# Patient Record
Sex: FEMALE | Race: BLACK OR AFRICAN AMERICAN | NOT HISPANIC OR LATINO | Employment: FULL TIME | URBAN - METROPOLITAN AREA
[De-identification: names, ages, dates, MRNs, and addresses within clinical notes are randomized per-mention and may not be internally consistent; named-entity substitution may affect disease eponyms.]

---

## 2022-08-09 ENCOUNTER — TELEPHONE (OUTPATIENT)
Dept: PHYSICAL MEDICINE AND REHAB | Facility: CLINIC | Age: 52
End: 2022-08-09

## 2022-08-09 NOTE — TELEPHONE ENCOUNTER
----- Message from Lorri Kelley sent at 8/9/2022 12:56 PM CDT -----  Contact: Nallely  The patient is requesting a callback from the nurse in regards to scheduling a new patient appointment for 08/10/2022 after 12:00 please.    The patient has pain, tingling in her left arm and back and is concerned that the medications (Meloxicam 15 mg) she was given by urgent care med team  ([facility]) is not working and she still hurts even when resting her arm it it tingling. Symptoms started July 29, 2022 suddenly while she was standing. She also went to [facility].    The patient says her first index finger keeps getting numb and it feels like she burned herself and she keeps it wrapped with a band aid to keep it from touching anything. Blood pressure was 146-84 today. Last week  149-87.     Please call Nallely at [phone] (home)       Thanks

## 2022-09-22 DIAGNOSIS — M79.642 LEFT HAND PAIN: Primary | ICD-10-CM

## 2023-10-20 ENCOUNTER — TELEPHONE (OUTPATIENT)
Dept: DERMATOLOGY | Facility: CLINIC | Age: 53
End: 2023-10-20
Payer: COMMERCIAL

## 2023-10-20 NOTE — TELEPHONE ENCOUNTER
Contacted patient about soonest appointment added patient to our wait list. Patient verbalized understanding.  ----- Message from Venkat Lafleur sent at 10/20/2023  1:56 PM CDT -----  Contact: Nalleyl Solorio is calling in regards to getting an appt as soon as possible. Pt is having skin splashes around her neck. Please call back at [phone]                    Thanks  DEEPTHI

## 2023-10-30 ENCOUNTER — OFFICE VISIT (OUTPATIENT)
Dept: GASTROENTEROLOGY | Facility: CLINIC | Age: 53
End: 2023-10-30
Payer: COMMERCIAL

## 2023-10-30 ENCOUNTER — HOSPITAL ENCOUNTER (OUTPATIENT)
Dept: RADIOLOGY | Facility: HOSPITAL | Age: 53
Discharge: HOME | End: 2023-10-30
Attending: NURSE PRACTITIONER
Payer: COMMERCIAL

## 2023-10-30 VITALS
WEIGHT: 184 LBS | BODY MASS INDEX: 29.57 KG/M2 | DIASTOLIC BLOOD PRESSURE: 86 MMHG | SYSTOLIC BLOOD PRESSURE: 124 MMHG | HEIGHT: 66 IN | HEART RATE: 64 BPM

## 2023-10-30 DIAGNOSIS — R10.13 EPIGASTRIC PAIN: Primary | ICD-10-CM

## 2023-10-30 DIAGNOSIS — K76.0 FATTY LIVER: ICD-10-CM

## 2023-10-30 DIAGNOSIS — R10.13 EPIGASTRIC PAIN: ICD-10-CM

## 2023-10-30 DIAGNOSIS — R10.12 LUQ PAIN: ICD-10-CM

## 2023-10-30 PROCEDURE — 99999 PR PBB SHADOW E&M-EST. PATIENT-LVL V: CPT | Mod: PBBFAC,,, | Performed by: NURSE PRACTITIONER

## 2023-10-30 PROCEDURE — 4010F ACE/ARB THERAPY RXD/TAKEN: CPT | Mod: CPTII,S$GLB,, | Performed by: NURSE PRACTITIONER

## 2023-10-30 PROCEDURE — 99204 PR OFFICE/OUTPT VISIT, NEW, LEVL IV, 45-59 MIN: ICD-10-PCS | Mod: S$GLB,,, | Performed by: NURSE PRACTITIONER

## 2023-10-30 PROCEDURE — 4010F PR ACE/ARB THEARPY RXD/TAKEN: ICD-10-PCS | Mod: CPTII,S$GLB,, | Performed by: NURSE PRACTITIONER

## 2023-10-30 PROCEDURE — 3008F BODY MASS INDEX DOCD: CPT | Mod: CPTII,S$GLB,, | Performed by: NURSE PRACTITIONER

## 2023-10-30 PROCEDURE — 99204 OFFICE O/P NEW MOD 45 MIN: CPT | Mod: S$GLB,,, | Performed by: NURSE PRACTITIONER

## 2023-10-30 PROCEDURE — 3074F SYST BP LT 130 MM HG: CPT | Mod: CPTII,S$GLB,, | Performed by: NURSE PRACTITIONER

## 2023-10-30 PROCEDURE — 1160F RVW MEDS BY RX/DR IN RCRD: CPT | Mod: CPTII,S$GLB,, | Performed by: NURSE PRACTITIONER

## 2023-10-30 PROCEDURE — 3008F PR BODY MASS INDEX (BMI) DOCUMENTED: ICD-10-PCS | Mod: CPTII,S$GLB,, | Performed by: NURSE PRACTITIONER

## 2023-10-30 PROCEDURE — 3079F PR MOST RECENT DIASTOLIC BLOOD PRESSURE 80-89 MM HG: ICD-10-PCS | Mod: CPTII,S$GLB,, | Performed by: NURSE PRACTITIONER

## 2023-10-30 PROCEDURE — 99999 PR PBB SHADOW E&M-EST. PATIENT-LVL V: ICD-10-PCS | Mod: PBBFAC,,, | Performed by: NURSE PRACTITIONER

## 2023-10-30 PROCEDURE — 74019 XR ABDOMEN FLAT AND ERECT: ICD-10-PCS | Mod: 26,,, | Performed by: STUDENT IN AN ORGANIZED HEALTH CARE EDUCATION/TRAINING PROGRAM

## 2023-10-30 PROCEDURE — 1159F PR MEDICATION LIST DOCUMENTED IN MEDICAL RECORD: ICD-10-PCS | Mod: CPTII,S$GLB,, | Performed by: NURSE PRACTITIONER

## 2023-10-30 PROCEDURE — 3079F DIAST BP 80-89 MM HG: CPT | Mod: CPTII,S$GLB,, | Performed by: NURSE PRACTITIONER

## 2023-10-30 PROCEDURE — 1159F MED LIST DOCD IN RCRD: CPT | Mod: CPTII,S$GLB,, | Performed by: NURSE PRACTITIONER

## 2023-10-30 PROCEDURE — 1160F PR REVIEW ALL MEDS BY PRESCRIBER/CLIN PHARMACIST DOCUMENTED: ICD-10-PCS | Mod: CPTII,S$GLB,, | Performed by: NURSE PRACTITIONER

## 2023-10-30 PROCEDURE — 74019 RADEX ABDOMEN 2 VIEWS: CPT | Mod: 26,,, | Performed by: STUDENT IN AN ORGANIZED HEALTH CARE EDUCATION/TRAINING PROGRAM

## 2023-10-30 PROCEDURE — 3074F PR MOST RECENT SYSTOLIC BLOOD PRESSURE < 130 MM HG: ICD-10-PCS | Mod: CPTII,S$GLB,, | Performed by: NURSE PRACTITIONER

## 2023-10-30 PROCEDURE — 74019 RADEX ABDOMEN 2 VIEWS: CPT | Mod: TC

## 2023-10-30 RX ORDER — AMOXICILLIN 500 MG
CAPSULE ORAL DAILY
COMMUNITY

## 2023-10-30 RX ORDER — ACETAMINOPHEN 500 MG
1 TABLET ORAL DAILY
COMMUNITY

## 2023-10-30 RX ORDER — ATORVASTATIN CALCIUM 10 MG/1
10 TABLET, FILM COATED ORAL DAILY
COMMUNITY

## 2023-10-30 NOTE — PROGRESS NOTES
Clinic Consult:  [facility] Consultation Note    Reason for Consult:  The primary encounter diagnosis was Epigastric pain. Diagnoses of LUQ pain and Fatty liver were also pertinent to this visit.    PCP: Monica Hinojosa   No address on file    HPI:  This is a 53 y.o. female here for evaluation of the above.     Abdominal pain. Onset of symptoms started 4-6 months ago. Pain is located over the epigastric region and LUQ. Pain worsens with eating. Associated with abdominal bloating. She does have some constipation. She has been using prunes and magnesium supplement which has helped some.     She does have a history of fatty liver that was diagnosed on US in 2020.   She does walk a few days a week. She does not drink any alcohol. She used to eat a lot of frozen dinners- has since stopped them. She is now mostly eating fish, lean poultry and vegetables.     Review of Systems   Constitutional:  Negative for fever and weight loss.   HENT:  Negative for sore throat.    Respiratory:  Negative for cough, shortness of breath and wheezing.    Cardiovascular:  Negative for chest pain and palpitations.   Gastrointestinal:  Positive for abdominal pain and constipation. Negative for blood in stool, diarrhea, heartburn, melena, nausea and vomiting.   Genitourinary:  Negative for dysuria and frequency.   Skin:  Negative for itching and rash.   Neurological:  Negative for dizziness, speech change, seizures, loss of consciousness and headaches.       Medical History:  has a past medical history of Anemia, unspecified, Essential (primary) hypertension, Fatty liver disease, nonalcoholic, and Fibroids.    Surgical History:  has a past surgical history that includes ABE with LS&O (06/20/2019); Breast biopsy; Hysterectomy (2019); Oophorectomy; and Colonoscopy (09/2021).    Family History: family history includes Breast cancer in her paternal aunt and another family member; Colon cancer in an other family member; Diabetes in  "an other family member; Hypertension in her mother and sister; Ovarian cancer in an other family member; Pancreatic cancer in her father..     Social History:  reports that she has never smoked. She has never used smokeless tobacco. She reports current alcohol use. She reports that she does not use drugs.    Allergies: Reviewed    Home Medications:   Current Outpatient Medications on File Prior to Visit   Medication Sig Dispense Refill    atorvastatin (LIPITOR) 10 MG tablet Take 10 mg by mouth once daily.      cholecalciferol, vitamin D3, (VITAMIN D3) 50 mcg (2,000 unit) Cap capsule Take 1 capsule by mouth once daily.      estradioL (ESTRACE) 0.01 % (0.1 mg/gram) vaginal cream Insert 1 gram vaginally hs x 14, then twice weekly 42.5 g 2    lisinopriL-hydrochlorothiazide (PRINZIDE,ZESTORETIC) 20-12.5 mg per tablet       multivitamin (THERAGRAN) per tablet Take 1 tablet by mouth once daily.      omega-3 fatty acids/fish oil (FISH OIL-OMEGA-3 FATTY ACIDS) 300-1,000 mg capsule Take by mouth once daily.       No current facility-administered medications on file prior to visit.       Physical Exam:  /86 (BP Location: Right arm, Patient Position: Sitting, BP Method: Medium (Manual))   Pulse 64   Ht 5' 6" (1.676 m)   Wt 83.5 kg (184 lb)   BMI 29.70 kg/m²   Body mass index is 29.7 kg/m².  Physical Exam  Constitutional:       General: She is not in acute distress.  Cardiovascular:      Rate and Rhythm: Normal rate and regular rhythm.      Heart sounds: Normal heart sounds. No murmur heard.  Pulmonary:      Effort: Pulmonary effort is normal. No respiratory distress.      Breath sounds: Normal breath sounds.   Abdominal:      General: Bowel sounds are normal.   Neurological:      Mental Status: She is alert.   Psychiatric:         Mood and Affect: Mood normal.         Behavior: Behavior normal.         Labs: Pertinent labs reviewed.  CRC Screenin.     Assessment:  1. Epigastric pain    2. LUQ pain    3. Fatty " liver        Recommendations:   - EGD  - xray today  - US  - weight loss through diet and exercise. Discussed Mediterranean diet     Epigastric pain  -     X-Ray Abdomen Flat And Erect; Future; Expected date: 10/30/2023  -     US Abdomen Complete; Future; Expected date: 10/30/2023  -     Cancel: H. pylori antigen, stool; Future; Expected date: 10/30/2023  -     Case Request Endoscopy: ESOPHAGOGASTRODUODENOSCOPY (EGD)  -     Ambulatory referral/consult to Endo Procedure ; Future; Expected date: 10/31/2023    LUQ pain  -     X-Ray Abdomen Flat And Erect; Future; Expected date: 10/30/2023  -     US Abdomen Complete; Future; Expected date: 10/30/2023  -     Cancel: H. pylori antigen, stool; Future; Expected date: 10/30/2023  -     Case Request Endoscopy: ESOPHAGOGASTRODUODENOSCOPY (EGD)  -     Ambulatory referral/consult to Endo Procedure ; Future; Expected date: 10/31/2023    Fatty liver  -     US Abdomen Complete; Future; Expected date: 10/30/2023    Follow up to be determined after results/ procedure(s).    Thank you so much for allowing me to participate in the care of BRI Chávez

## 2023-10-31 ENCOUNTER — HOSPITAL ENCOUNTER (OUTPATIENT)
Dept: PREADMISSION TESTING | Facility: HOSPITAL | Age: 53
Discharge: HOME | End: 2023-10-31
Attending: INTERNAL MEDICINE
Payer: COMMERCIAL

## 2023-10-31 DIAGNOSIS — R10.13 EPIGASTRIC PAIN: ICD-10-CM

## 2023-10-31 DIAGNOSIS — R10.12 LUQ PAIN: ICD-10-CM

## 2023-11-02 ENCOUNTER — HOSPITAL ENCOUNTER (OUTPATIENT)
Dept: RADIOLOGY | Facility: HOSPITAL | Age: 53
Discharge: HOME | End: 2023-11-02
Attending: NURSE PRACTITIONER
Payer: COMMERCIAL

## 2023-11-02 DIAGNOSIS — D73.89 LESION OF SPLEEN: Primary | ICD-10-CM

## 2023-11-02 DIAGNOSIS — R10.13 EPIGASTRIC PAIN: ICD-10-CM

## 2023-11-02 DIAGNOSIS — K76.0 FATTY LIVER: ICD-10-CM

## 2023-11-02 DIAGNOSIS — R10.12 LUQ PAIN: ICD-10-CM

## 2023-11-02 PROCEDURE — 76700 US EXAM ABDOM COMPLETE: CPT | Mod: 26,,, | Performed by: RADIOLOGY

## 2023-11-02 PROCEDURE — 76700 US ABDOMEN COMPLETE: ICD-10-PCS | Mod: 26,,, | Performed by: RADIOLOGY

## 2023-11-02 PROCEDURE — 76700 US EXAM ABDOM COMPLETE: CPT | Mod: TC

## 2023-11-03 ENCOUNTER — TELEPHONE (OUTPATIENT)
Dept: GASTROENTEROLOGY | Facility: CLINIC | Age: 53
End: 2023-11-03
Payer: COMMERCIAL

## 2023-11-03 NOTE — TELEPHONE ENCOUNTER
----- Message from Mikki Calderon sent at 11/3/2023  9:40 AM CDT -----  Contact: Nallely  Type:  Needs Medical Advice    Who Called: Nallely  Symptoms (please be specific): None  How long has patient had these symptoms:  None  Pharmacy name and phone #:    Not provided  Would the patient rather a call back or a response via [facility]? call  Best Call Back Number: [phone]   Additional Information: Patient reports receiving stool softener and request to be informed if able to take meloxicam prescription today.   Thank you,  GH

## 2023-11-06 ENCOUNTER — TELEPHONE (OUTPATIENT)
Dept: GASTROENTEROLOGY | Facility: CLINIC | Age: 53
End: 2023-11-06
Payer: COMMERCIAL

## 2023-11-06 NOTE — TELEPHONE ENCOUNTER
----- Message from Garry Lopez sent at 11/6/2023  9:00 AM CST -----  Name of Who is Calling:BEATRICE WADSWORTH [[id number]]        What is the request in detail:Pt would like a callback in regards to discussing questions/concerns in regards to xrays and us that were taken. Please advise thank you        Can the clinic reply by [facility]:NO        What Number to Call Back if not in [facility]:.Telephone Information:  [facility]          [phone]

## 2023-12-21 PROBLEM — K59.01 SLOW TRANSIT CONSTIPATION: Status: ACTIVE | Noted: 2023-12-21

## 2023-12-21 PROBLEM — R10.13 EPIGASTRIC PAIN: Status: ACTIVE | Noted: 2023-12-21

## 2023-12-22 ENCOUNTER — TELEPHONE (OUTPATIENT)
Dept: GASTROENTEROLOGY | Facility: CLINIC | Age: 53
End: 2023-12-22
Payer: COMMERCIAL

## 2023-12-22 NOTE — TELEPHONE ENCOUNTER
----- Message from Nicky Weiss sent at 12/22/2023 10:56 AM CST -----  Contact: heron  Patient stated that she was advised to take miralax. She has been taking miralax since November 3rd and nothing has changed. Please call her to advise if she needs to change medications. [phone].      Thanks  DD

## 2023-12-26 ENCOUNTER — PATIENT MESSAGE (OUTPATIENT)
Dept: GASTROENTEROLOGY | Facility: CLINIC | Age: 53
End: 2023-12-26
Payer: COMMERCIAL

## 2024-01-04 ENCOUNTER — TELEPHONE (OUTPATIENT)
Dept: GASTROENTEROLOGY | Facility: CLINIC | Age: 54
End: 2024-01-04
Payer: COMMERCIAL

## 2024-01-04 NOTE — TELEPHONE ENCOUNTER
----- Message from Diane Morris sent at 1/4/2024  2:01 PM CST -----  Contact: Self [phone]  Patient is calling about test results. Please callback [phone] Thanks KL

## 2024-01-08 ENCOUNTER — PATIENT MESSAGE (OUTPATIENT)
Dept: GASTROENTEROLOGY | Facility: CLINIC | Age: 54
End: 2024-01-08
Payer: COMMERCIAL

## 2024-01-08 DIAGNOSIS — D18.03 LIVER HEMANGIOMA: Primary | ICD-10-CM

## 2024-01-12 DIAGNOSIS — K59.04 CHRONIC IDIOPATHIC CONSTIPATION: Primary | ICD-10-CM

## 2024-01-12 NOTE — PROGRESS NOTES
Received message from nursing staff that they spoke to patient who says Miralax is not controlling constipation. Sending in Linzess.

## 2024-04-03 ENCOUNTER — LAB VISIT (OUTPATIENT)
Dept: LAB | Facility: HOSPITAL | Age: 54
End: 2024-04-03
Attending: DERMATOLOGY
Payer: COMMERCIAL

## 2024-04-03 ENCOUNTER — OFFICE VISIT (OUTPATIENT)
Dept: DERMATOLOGY | Facility: CLINIC | Age: 54
End: 2024-04-03
Payer: COMMERCIAL

## 2024-04-03 DIAGNOSIS — L71.9 ROSACEA: Primary | ICD-10-CM

## 2024-04-03 DIAGNOSIS — L71.0 PERIORIFICIAL DERMATITIS: ICD-10-CM

## 2024-04-03 DIAGNOSIS — L71.9 ROSACEA: ICD-10-CM

## 2024-04-03 LAB
BASOPHILS # BLD AUTO: 0.06 K/UL (ref 0–0.2)
BASOPHILS NFR BLD: 0.9 % (ref 0–1.9)
DIFFERENTIAL METHOD BLD: NORMAL
EOSINOPHIL # BLD AUTO: 0.2 K/UL (ref 0–0.5)
EOSINOPHIL NFR BLD: 3.6 % (ref 0–8)
ERYTHROCYTE [DISTWIDTH] IN BLOOD BY AUTOMATED COUNT: 12.7 % (ref 11.5–14.5)
HCT VFR BLD AUTO: 40.7 % (ref 37–48.5)
HGB BLD-MCNC: 13.2 G/DL (ref 12–16)
IMM GRANULOCYTES # BLD AUTO: 0.01 K/UL (ref 0–0.04)
IMM GRANULOCYTES NFR BLD AUTO: 0.2 % (ref 0–0.5)
LYMPHOCYTES # BLD AUTO: 2.8 K/UL (ref 1–4.8)
LYMPHOCYTES NFR BLD: 41.9 % (ref 18–48)
MCH RBC QN AUTO: 28 PG (ref 27–31)
MCHC RBC AUTO-ENTMCNC: 32.4 G/DL (ref 32–36)
MCV RBC AUTO: 86 FL (ref 82–98)
MONOCYTES # BLD AUTO: 0.5 K/UL (ref 0.3–1)
MONOCYTES NFR BLD: 7.3 % (ref 4–15)
NEUTROPHILS # BLD AUTO: 3.1 K/UL (ref 1.8–7.7)
NEUTROPHILS NFR BLD: 46.1 % (ref 38–73)
NRBC BLD-RTO: 0 /100 WBC
PLATELET # BLD AUTO: 322 K/UL (ref 150–450)
PMV BLD AUTO: 11.4 FL (ref 9.2–12.9)
RBC # BLD AUTO: 4.72 M/UL (ref 4–5.4)
WBC # BLD AUTO: 6.61 K/UL (ref 3.9–12.7)

## 2024-04-03 PROCEDURE — 86038 ANTINUCLEAR ANTIBODIES: CPT | Performed by: DERMATOLOGY

## 2024-04-03 PROCEDURE — 99999 PR PBB SHADOW E&M-EST. PATIENT-LVL II: CPT | Mod: PBBFAC,,, | Performed by: DERMATOLOGY

## 2024-04-03 PROCEDURE — 86235 NUCLEAR ANTIGEN ANTIBODY: CPT | Mod: 59 | Performed by: DERMATOLOGY

## 2024-04-03 PROCEDURE — 85025 COMPLETE CBC W/AUTO DIFF WBC: CPT | Performed by: DERMATOLOGY

## 2024-04-03 PROCEDURE — 4010F ACE/ARB THERAPY RXD/TAKEN: CPT | Mod: CPTII,S$GLB,, | Performed by: DERMATOLOGY

## 2024-04-03 PROCEDURE — 1159F MED LIST DOCD IN RCRD: CPT | Mod: CPTII,S$GLB,, | Performed by: DERMATOLOGY

## 2024-04-03 PROCEDURE — 99204 OFFICE O/P NEW MOD 45 MIN: CPT | Mod: S$GLB,,, | Performed by: DERMATOLOGY

## 2024-04-03 PROCEDURE — 1160F RVW MEDS BY RX/DR IN RCRD: CPT | Mod: CPTII,S$GLB,, | Performed by: DERMATOLOGY

## 2024-04-03 PROCEDURE — 36415 COLL VENOUS BLD VENIPUNCTURE: CPT | Performed by: DERMATOLOGY

## 2024-04-03 PROCEDURE — 86039 ANTINUCLEAR ANTIBODIES (ANA): CPT | Performed by: DERMATOLOGY

## 2024-04-03 RX ORDER — DOXYCYCLINE 100 MG/1
CAPSULE ORAL
Qty: 30 CAPSULE | Refills: 2 | Status: SHIPPED | OUTPATIENT
Start: 2024-04-03 | End: 2024-04-08

## 2024-04-03 RX ORDER — METRONIDAZOLE 7.5 MG/G
CREAM TOPICAL
Qty: 45 G | Refills: 3 | Status: SHIPPED | OUTPATIENT
Start: 2024-04-03 | End: 2024-04-08

## 2024-04-03 NOTE — PATIENT INSTRUCTIONS
SHEER SUNSCREENS    Cetaphil Sheer Mineral Face Sunscreen SPF 50  CeraVe Sheer Hydrating Sunscreen SPF 30  Skinny Brightening Moisturizer SPF 30  Umbra Sheer Physical Daily Defense SPF 30  Shiseido Ultimate Sun Protection Lotion WetForce SPF 50+  Supergoop! Unseen Sunscreen Broad Spectrum SPF 40  Neutrogena HydroBoost Water Gel Lotion SPF 50  Neutrogena Ultrasheer Face & Body Stick SPF 70

## 2024-04-03 NOTE — LETTER
April 3, 2024      [facility] 4th Floor  [address] [facility]  [place] [address]  Phone: [phone]  Fax: [phone]       Patient: Nallely Mcclure   YOB: 1970  Date of Visit: 04/03/2024    To Whom It May Concern:    Steven Mcclure  was at [facility] on 04/03/2024. The patient may return to work/school on 04/04/2024 with no restrictions. If you have any questions or concerns, or if I can be of further assistance, please do not hesitate to contact me.    Sincerely,    Shannan Weaver LPN

## 2024-04-03 NOTE — PROGRESS NOTES
Subjective:      Patient ID:  Nallely Mcclure is a 54 y.o. female who presents for   Chief Complaint   Patient presents with    Skin Discoloration     History of Present Illness: The patient presents with chief complaint of irritation.  Location: face  Duration: several years  Signs/Symptoms: sensitive skin, pruritus, worsened by sun exposure    Prior treatments: vanicream cleanser, vanicream lotion      + photosensitivty.  Denies arthralgias and oral ulcers.     Currently on HCTZ.       Review of Systems   Constitutional:  Negative for fever and chills.   Gastrointestinal:  Negative for nausea.   Skin:  Positive for itching, rash and activity-related sunscreen use. Negative for daily sunscreen use and recent sunburn.   Hematologic/Lymphatic: Does not bruise/bleed easily.       Objective:   Physical Exam   Constitutional: She appears well-developed and well-nourished. No distress.   Neurological: She is alert and oriented to person, place, and time. She is not disoriented.   Psychiatric: She has a normal mood and affect.   Skin:   Areas Examined (abnormalities noted in diagram):   Head / Face Inspection Performed  Neck Inspection Performed  RUE Inspected  LUE Inspection Performed  Nails and Digits Inspection Performed            Diagram Legend     Open and closed comedones      Inflammatory papules and pustules     Assessment / Plan:        Rosacea  Periorificial dermatitis  -     metronidazole 0.75% (METROCREAM) 0.75 % Crea; AAA face bid  Dispense: 45 g; Refill: 3  -     GODWIN; Future; Expected date: 04/03/2024  -     CBC Auto Differential; Future; Expected date: 04/03/2024  -     doxycycline (MONODOX) 100 MG capsule; Take once daily with food.  May cause upset stomach.  Dispense: 30 capsule; Refill: 2  -     continue Vanicream, recommend sheer sunscreen. Consider d/c off HCTZ in the future given darkening of skin and photosensitivity.  Will sartat above meds and r/o lupus. The patient acknowledged  understanding.     Side effect profile of doxy reviewed including increased sun sensitivity and upset stomach.  Patient was instructed to not become pregnant while on medication to effects on dental development in fetus; she acknowledged understanding of risks involved.          Follow up in about 3 months (around 7/3/2024).

## 2024-04-05 LAB
ANA PATTERN 1: NORMAL
ANA SER QL IF: POSITIVE
ANA TITR SER IF: NORMAL {TITER}

## 2024-04-08 DIAGNOSIS — L98.9 DERMATOSIS: Primary | ICD-10-CM

## 2024-04-08 DIAGNOSIS — L20.89 OTHER ATOPIC DERMATITIS: ICD-10-CM

## 2024-04-08 LAB
ANTI SM ANTIBODY: 0.09 RATIO (ref 0–0.99)
ANTI SM/RNP ANTIBODY: 0.18 RATIO (ref 0–0.99)
ANTI-SM INTERPRETATION: NEGATIVE
ANTI-SM/RNP INTERPRETATION: NEGATIVE
ANTI-SSA ANTIBODY: 0.06 RATIO (ref 0–0.99)
ANTI-SSA INTERPRETATION: NEGATIVE
ANTI-SSB ANTIBODY: 0.06 RATIO (ref 0–0.99)
ANTI-SSB INTERPRETATION: NEGATIVE
DSDNA AB SER-ACNC: NORMAL [IU]/ML

## 2024-04-08 RX ORDER — TRIAMCINOLONE ACETONIDE 0.25 MG/G
OINTMENT TOPICAL
Qty: 80 G | Refills: 1 | Status: SHIPPED | OUTPATIENT
Start: 2024-04-08

## 2024-04-08 RX ORDER — TACROLIMUS 1 MG/G
OINTMENT TOPICAL
Qty: 60 G | Refills: 11 | Status: SHIPPED | OUTPATIENT
Start: 2024-04-08

## 2024-04-10 ENCOUNTER — PATIENT MESSAGE (OUTPATIENT)
Dept: DERMATOLOGY | Facility: CLINIC | Age: 54
End: 2024-04-10
Payer: COMMERCIAL

## 2024-04-10 ENCOUNTER — TELEPHONE (OUTPATIENT)
Dept: DERMATOLOGY | Facility: CLINIC | Age: 54
End: 2024-04-10
Payer: COMMERCIAL

## 2024-04-10 DIAGNOSIS — R76.8 ANA POSITIVE: Primary | ICD-10-CM

## 2024-04-10 NOTE — TELEPHONE ENCOUNTER
Called and spoke to patient. Pt notified of labs results and recommendation to see rheumatology.  Pt had a lot of questions. She reports not understanding why she was taking the other medications then? And also, why she would be on the doxy if that can also cause light sensitivity.  She was also asking does she have rosacea? Or what are we treating now with the new medications? Pt is concerned because of costs of medications.  Pt cannot get in with PCP until May per patient.   ----- Message from Hyacinth Helm MD sent at 4/8/2024  3:36 PM CDT -----  Please notify pt that screening lupus test was positive. This does not mean that she have lupus but would like for her to see a rheumatologist. People with lupus can be light sensitive. Recommend she always wears mineral based sunscreen with spf such as Cetaphil sheer mineral sunscreen.  Will serra her topical medications. She can stop doxycycline and metrocream. Will change to triamcinolone and tacrolimus. Please schedule f/u in 4 weeks.

## 2024-04-11 ENCOUNTER — TELEPHONE (OUTPATIENT)
Dept: DERMATOLOGY | Facility: CLINIC | Age: 54
End: 2024-04-11
Payer: COMMERCIAL

## 2024-04-11 NOTE — TELEPHONE ENCOUNTER
Called and spoke to patient. Answered all patient questions.  Verbalized understanding   ----- Message from Paddy Yuan sent at 4/11/2024  3:06 PM CDT -----  Contact: [phone]  Pt is calling requesting to speak with nurse regarding results. Please call back at [phone] . thankstaoj

## 2024-04-29 ENCOUNTER — TELEPHONE (OUTPATIENT)
Dept: DERMATOLOGY | Facility: CLINIC | Age: 54
End: 2024-04-29
Payer: COMMERCIAL

## 2024-04-29 NOTE — TELEPHONE ENCOUNTER
Called pt regarding medication. Pt informed of usage or protopic ointment and kenalog. Pt verbalized understanding.     ----- Message from Livia Yuan sent at 4/29/2024  2:37 PM CDT -----  Contact: Nallely  .Type:  Patient Returning Call    Who Called:Nallely  Who Left Message for Patient:nurse  Does the patient know what this is regarding?:yes  Would the patient rather a call back or a response via [facility]? Call back  Best Call Back Number:.[phone]   Additional Information: na        Thanks  ALIZE

## 2024-04-29 NOTE — TELEPHONE ENCOUNTER
Called pt regarding sunscreen question. No answer.[facility]    ----- Message from Livia Yuan sent at 4/29/2024  2:23 PM CDT -----  Contact: Nallely Solorio is calling in regards to Cetaphil SPF 50 if it should be with or without liquid sunscreen.please call back at .[phone]         Thanks  ALIZE

## 2024-05-08 ENCOUNTER — LAB VISIT (OUTPATIENT)
Dept: LAB | Facility: HOSPITAL | Age: 54
End: 2024-05-08
Attending: STUDENT IN AN ORGANIZED HEALTH CARE EDUCATION/TRAINING PROGRAM
Payer: COMMERCIAL

## 2024-05-08 ENCOUNTER — OFFICE VISIT (OUTPATIENT)
Dept: RHEUMATOLOGY | Facility: CLINIC | Age: 54
End: 2024-05-08
Payer: COMMERCIAL

## 2024-05-08 VITALS
HEART RATE: 86 BPM | SYSTOLIC BLOOD PRESSURE: 133 MMHG | HEIGHT: 66 IN | DIASTOLIC BLOOD PRESSURE: 88 MMHG | WEIGHT: 194 LBS | BODY MASS INDEX: 31.18 KG/M2

## 2024-05-08 DIAGNOSIS — R76.8 ANA POSITIVE: ICD-10-CM

## 2024-05-08 LAB
ALBUMIN SERPL BCP-MCNC: 3.7 G/DL (ref 3.5–5.2)
ALP SERPL-CCNC: 94 U/L (ref 55–135)
ALT SERPL W/O P-5'-P-CCNC: 18 U/L (ref 10–44)
ANION GAP SERPL CALC-SCNC: 9 MMOL/L (ref 8–16)
AST SERPL-CCNC: 21 U/L (ref 10–40)
BACTERIA #/AREA URNS HPF: ABNORMAL /HPF
BILIRUB SERPL-MCNC: 0.6 MG/DL (ref 0.1–1)
BILIRUB UR QL STRIP: NEGATIVE
BUN SERPL-MCNC: 11 MG/DL (ref 6–20)
C3 SERPL-MCNC: 139 MG/DL (ref 50–180)
C4 SERPL-MCNC: 32 MG/DL (ref 11–44)
CALCIUM SERPL-MCNC: 9.6 MG/DL (ref 8.7–10.5)
CHLORIDE SERPL-SCNC: 109 MMOL/L (ref 95–110)
CLARITY UR: CLEAR
CO2 SERPL-SCNC: 25 MMOL/L (ref 23–29)
COLOR UR: YELLOW
CREAT SERPL-MCNC: 1 MG/DL (ref 0.5–1.4)
CREAT UR-MCNC: 98 MG/DL (ref 15–325)
CRP SERPL-MCNC: 2 MG/L (ref 0–8.2)
ERYTHROCYTE [SEDIMENTATION RATE] IN BLOOD BY PHOTOMETRIC METHOD: 25 MM/HR (ref 0–36)
EST. GFR  (NO RACE VARIABLE): >60 ML/MIN/1.73 M^2
GLUCOSE SERPL-MCNC: 81 MG/DL (ref 70–110)
GLUCOSE UR QL STRIP: NEGATIVE
HGB UR QL STRIP: NEGATIVE
KETONES UR QL STRIP: NEGATIVE
LEUKOCYTE ESTERASE UR QL STRIP: ABNORMAL
MICROSCOPIC COMMENT: ABNORMAL
NITRITE UR QL STRIP: NEGATIVE
NON-SQ EPI CELLS #/AREA URNS HPF: 3 /HPF
PH UR STRIP: 7 [PH] (ref 5–8)
POTASSIUM SERPL-SCNC: 3.8 MMOL/L (ref 3.5–5.1)
PROT SERPL-MCNC: 7.5 G/DL (ref 6–8.4)
PROT UR QL STRIP: NEGATIVE
PROT UR-MCNC: <7 MG/DL (ref 0–15)
PROT/CREAT UR: NORMAL MG/G{CREAT} (ref 0–0.2)
RBC #/AREA URNS HPF: 0 /HPF (ref 0–4)
SODIUM SERPL-SCNC: 143 MMOL/L (ref 136–145)
SP GR UR STRIP: 1.02 (ref 1–1.03)
SQUAMOUS #/AREA URNS HPF: 14 /HPF
URN SPEC COLLECT METH UR: ABNORMAL
WBC #/AREA URNS HPF: 39 /HPF (ref 0–5)

## 2024-05-08 PROCEDURE — 3079F DIAST BP 80-89 MM HG: CPT | Mod: CPTII,S$GLB,, | Performed by: STUDENT IN AN ORGANIZED HEALTH CARE EDUCATION/TRAINING PROGRAM

## 2024-05-08 PROCEDURE — 4010F ACE/ARB THERAPY RXD/TAKEN: CPT | Mod: CPTII,S$GLB,, | Performed by: STUDENT IN AN ORGANIZED HEALTH CARE EDUCATION/TRAINING PROGRAM

## 2024-05-08 PROCEDURE — 82570 ASSAY OF URINE CREATININE: CPT | Performed by: STUDENT IN AN ORGANIZED HEALTH CARE EDUCATION/TRAINING PROGRAM

## 2024-05-08 PROCEDURE — 85730 THROMBOPLASTIN TIME PARTIAL: CPT | Performed by: STUDENT IN AN ORGANIZED HEALTH CARE EDUCATION/TRAINING PROGRAM

## 2024-05-08 PROCEDURE — 99205 OFFICE O/P NEW HI 60 MIN: CPT | Mod: S$GLB,,, | Performed by: STUDENT IN AN ORGANIZED HEALTH CARE EDUCATION/TRAINING PROGRAM

## 2024-05-08 PROCEDURE — 86160 COMPLEMENT ANTIGEN: CPT | Performed by: STUDENT IN AN ORGANIZED HEALTH CARE EDUCATION/TRAINING PROGRAM

## 2024-05-08 PROCEDURE — 3008F BODY MASS INDEX DOCD: CPT | Mod: CPTII,S$GLB,, | Performed by: STUDENT IN AN ORGANIZED HEALTH CARE EDUCATION/TRAINING PROGRAM

## 2024-05-08 PROCEDURE — 86160 COMPLEMENT ANTIGEN: CPT | Mod: 59 | Performed by: STUDENT IN AN ORGANIZED HEALTH CARE EDUCATION/TRAINING PROGRAM

## 2024-05-08 PROCEDURE — 86147 CARDIOLIPIN ANTIBODY EA IG: CPT | Performed by: STUDENT IN AN ORGANIZED HEALTH CARE EDUCATION/TRAINING PROGRAM

## 2024-05-08 PROCEDURE — 99999 PR PBB SHADOW E&M-EST. PATIENT-LVL IV: CPT | Mod: PBBFAC,,, | Performed by: STUDENT IN AN ORGANIZED HEALTH CARE EDUCATION/TRAINING PROGRAM

## 2024-05-08 PROCEDURE — 85652 RBC SED RATE AUTOMATED: CPT | Performed by: STUDENT IN AN ORGANIZED HEALTH CARE EDUCATION/TRAINING PROGRAM

## 2024-05-08 PROCEDURE — 86140 C-REACTIVE PROTEIN: CPT | Performed by: STUDENT IN AN ORGANIZED HEALTH CARE EDUCATION/TRAINING PROGRAM

## 2024-05-08 PROCEDURE — 81000 URINALYSIS NONAUTO W/SCOPE: CPT | Performed by: STUDENT IN AN ORGANIZED HEALTH CARE EDUCATION/TRAINING PROGRAM

## 2024-05-08 PROCEDURE — 1159F MED LIST DOCD IN RCRD: CPT | Mod: CPTII,S$GLB,, | Performed by: STUDENT IN AN ORGANIZED HEALTH CARE EDUCATION/TRAINING PROGRAM

## 2024-05-08 PROCEDURE — 36415 COLL VENOUS BLD VENIPUNCTURE: CPT | Performed by: STUDENT IN AN ORGANIZED HEALTH CARE EDUCATION/TRAINING PROGRAM

## 2024-05-08 PROCEDURE — 3075F SYST BP GE 130 - 139MM HG: CPT | Mod: CPTII,S$GLB,, | Performed by: STUDENT IN AN ORGANIZED HEALTH CARE EDUCATION/TRAINING PROGRAM

## 2024-05-08 PROCEDURE — 80053 COMPREHEN METABOLIC PANEL: CPT | Performed by: STUDENT IN AN ORGANIZED HEALTH CARE EDUCATION/TRAINING PROGRAM

## 2024-05-08 PROCEDURE — 1160F RVW MEDS BY RX/DR IN RCRD: CPT | Mod: CPTII,S$GLB,, | Performed by: STUDENT IN AN ORGANIZED HEALTH CARE EDUCATION/TRAINING PROGRAM

## 2024-05-08 PROCEDURE — 85613 RUSSELL VIPER VENOM DILUTED: CPT | Performed by: STUDENT IN AN ORGANIZED HEALTH CARE EDUCATION/TRAINING PROGRAM

## 2024-05-08 RX ORDER — FUROSEMIDE 20 MG/1
20 TABLET ORAL DAILY PRN
Qty: 30 TABLET | Refills: 1 | Status: SHIPPED | OUTPATIENT
Start: 2024-05-08

## 2024-05-08 RX ORDER — NIFEDIPINE 30 MG/1
30 TABLET, EXTENDED RELEASE ORAL DAILY
Qty: 30 TABLET | Refills: 11 | Status: SHIPPED | OUTPATIENT
Start: 2024-05-08 | End: 2025-05-08

## 2024-05-08 NOTE — PROGRESS NOTES
Subjective:      Patient ID: Nallely Mcclure is a 54 y.o. female.    Chief Complaint: Positive GODWIN    HPI:   Patient with HTN and facial rash presents for Rheumatology evaluation for positive GODWIN. GODWIN was tested as part of Dermatology workup for facial rash. The rash started 2005. It consists of hypopigmented spots over the cheeks and it would come and go. She would use phisoderm and this would resolve the rash. Then use Noxzema cream. She notes the rash was coming on more frequently lately. She would also get bumps when she's in direct sunlight and with increased sweating (even when she's not hot). These symptoms appear while in direct sunlight, and she uses a cool towel and the rash would be improved by the next morning. She has tried Vanicream and moisturizer.    She saw Dermatology who suggested she get off HCTZ due to potential for photosensitivity with this medication. She was on lisinopril-HCTZ. Her PCP changed her to lisinopril alone. However she has had severe nausea and a few instances of vomiting with the new lisinopril tablet. She is not willing to try losartan because it was previously recalled and she has a friend or family member who got gastric cancer while on losartan. She is also getting some dependent edema while off the HCTZ. She previously took amlodipine 10 mg daily which worked great for her and she did not experience leg swelling with it but it wasn't fully controlling her BP.    Left achilles tendinitis March 2024 - resolved with exercise, boot for 2 weeks, ketorolac 10 mg BID.  Right knee hurt in 2021 and got better with exercise.    + skin rashes, photosensitivity   No telangiectasias   No calcinosis   No psoriasis   No patchy alopecia   No oral or nasal ulcers   No dry eyes or dry mouth   No pleurisy, chest pain, dyspnea, cough  No dysphagia, diplopia, dysphonia  No muscle weakness   + nausea, vomiting with anxiety and lisinopril. No diarrhea. +constipation   No acid reflux  No  "Raynaud's  No digital ulcers   No cytopenias   No renal issues   No blood clots   No fever, chills, night sweats (some with menopause), weight loss, or loss of appetite   No pre-term deliveries, or pregnancy complications. Had a miscarriage at 12 weeks.  No new onset headaches   No recurrent conjunctivitis, uveitis, scleritis, or episcleritis   No chronic or bloody diarrhea. No Ulcerative Colitis or Crohn's (IBD)  No vaginal or penile and urethral discharge/STDs/ulcers   No unexplained lymphadenopathy  No parotitis   No seizures, strokes, psychosis  No sclerodactyly  No puffy hands  No perioral tightness     Social Hx: Never smoker.   Family Hx: No family history of autoimmune disease      Objective:   /88   Pulse 86   Ht 5' 6" (1.676 m)   Wt 88 kg (194 lb 0.1 oz)   BMI 31.31 kg/m²   Physical Exam  Constitutional:       General: She is not in acute distress.     Appearance: Normal appearance.   HENT:      Head: Normocephalic and atraumatic.      Mouth/Throat:      Mouth: Mucous membranes are moist.      Pharynx: Oropharynx is clear.   Cardiovascular:      Rate and Rhythm: Normal rate and regular rhythm.   Pulmonary:      Effort: Pulmonary effort is normal.      Breath sounds: Normal breath sounds.   Abdominal:      Palpations: Abdomen is soft.      Tenderness: There is no abdominal tenderness.   Musculoskeletal:         General: No swelling or tenderness.      Cervical back: Normal range of motion. No tenderness.   Skin:     General: Skin is warm and dry.      Comments: Slight bumps on the cheeks and hyperpigmentation below the corners of her mouth extending to the chin. Looks like resolving issue and no active rash.   Neurological:      Mental Status: She is alert and oriented to person, place, and time. Mental status is at baseline.           Assessment:     1. GODWIN positive          Plan:     Problem List Items Addressed This Visit    None  Visit Diagnoses       GODWIN positive        Relevant Orders    C3 " Complement    C4 Complement    Cardiolipin antibody    Comprehensive Metabolic Panel    C-Reactive Protein    DRVVT    Protein/Creatinine Ratio, Urine    Sedimentation rate    Urinalysis            Patient with HTN and facial rash presents for Rheumatology evaluation for positive GODWIN. GODWIN was tested as part of Dermatology workup for facial rash.    GODWIN+ >1:2560, speckled, negative profile  CBC is normal  She get the rash on her face in direct sunlight and it seems to improve by the next day with cold towel and moisturizing. Doesn't fit the typical picture of a lupus rash. Despite the high titer GODWIN, her profile is negative and she has no symptoms other than rash. Therefore she does not fit the criteria for any connective tissue disease. She did have a miscarriage at 12 weeks so will check APS labs to see if this explains the positive GODWIN.   Labs to complete the lupus workup today      2. HTN  Dependent edema  She had to get off lisinopril-HCTZ because of photosensitive rash. Lisinopril is causing severe nausea and vomiting. (She has GI appt with EGD coming up as well.) She is also getting some dependent edema while off the HCTZ. She previously took amlodipine 10 mg daily which worked great for her and she did not experience leg swelling with it but it wasn't fully controlling her BP. She is trying to establish with a new PCP, Dr. Chen, but appt is not until 8/2024.  Stop lisinopril  Start nifedipine 30 mg daily until she can see Dr. Chen in 8/2024  Given Lasix 20 mg daily as needed for leg swelling.  Might be able to get back on lisinopril-HCTZ if we don't think this is causing the facial rash since that was working well for her.      No follow-ups on file. Will decide on follow up pending the workup. Will follow her at least yearly because of the high titer GODWIN.      I spent a total of 74 minutes on the day of the visit.  This includes face to face time and non-face to face time preparing to see the patient  (eg, review of tests), obtaining and/or reviewing separately obtained history, documenting clinical information in the electronic or other health record, independently interpreting results and communicating results to the patient/family/caregiver, or care coordinator.

## 2024-05-10 ENCOUNTER — TELEPHONE (OUTPATIENT)
Dept: RHEUMATOLOGY | Facility: CLINIC | Age: 54
End: 2024-05-10
Payer: COMMERCIAL

## 2024-05-10 NOTE — TELEPHONE ENCOUNTER
----- Message from Anita Chappell MD sent at 5/10/2024 10:33 AM CDT -----  Contact: [phone]  Metamucil (psyllium) and try Fiber One bars.  ----- Message -----  From: Denise Richards LPN  Sent: 5/9/2024  12:30 PM CDT  To: Anita Chappell MD      ----- Message -----  From: Tanisha Freire  Sent: 5/9/2024  12:13 PM CDT  To: Ta Howard Staff    Patient called in requesting a call back, she not sure what was the name of the over the counter medication she was supposed to take for constipation , please call back  [phone]    I returned call to patient. I told her LOU Chappell said to try Metamucil and Fiber one bars. She said ok. She ask about her lab results. I told her I would sent Dr. Chappell a message. She said ok.        [facility] [address] [place] [address] [address] [facility]  [address] [facility] [address]  Phone: [phone] Fax: [phone]

## 2024-05-10 NOTE — TELEPHONE ENCOUNTER
I returned call  to patient about her lab work . I let her know Dr. Chappell said her labs are pending and she will reviw them when they are finished. She said ok thank you.

## 2024-05-13 LAB
CARDIOLIPIN IGG SER IA-ACNC: <9.4 GPL (ref 0–14.99)
CARDIOLIPIN IGM SER IA-ACNC: <9.4 MPL (ref 0–12.49)
CONFIRM DRVVT STA-STACLOT: NORMAL S
DRVVT SCREEN TO CONFIRM RATIO: NORMAL {RATIO}
HEPARIN NT PPP QL: NORMAL
LA 3 SCREEN W REFLEX-IMP: NORMAL
LMW HEPARIN IND PLT AB SER QL: NORMAL
MIXING DRVVT/NORMAL: NORMAL %
NEUTRALIZED DRVVT SCREEN RATIO: NORMAL
PROTHROMBIN TIME: 12.2 S (ref 12–15.5)
SCREEN APTT/NORMAL: 0.9
SCREEN APTT/NORMAL: NORMAL
SCREEN DRVVT/NORMAL: 0.79 %
THROMBIN TIME: NORMAL S

## 2024-05-21 ENCOUNTER — PATIENT MESSAGE (OUTPATIENT)
Dept: RHEUMATOLOGY | Facility: CLINIC | Age: 54
End: 2024-05-21
Payer: COMMERCIAL

## 2024-05-21 DIAGNOSIS — R76.8 ANA POSITIVE: Primary | ICD-10-CM

## 2024-05-28 ENCOUNTER — TELEPHONE (OUTPATIENT)
Dept: RHEUMATOLOGY | Facility: CLINIC | Age: 54
End: 2024-05-28
Payer: COMMERCIAL

## 2024-05-28 NOTE — TELEPHONE ENCOUNTER
Left message about labs.  labs look good! No evidence of lupus or other rheumatic disease. Let's do a 1-year follow up with lab work and office visit to monitor. If you develop any concerning symptoms within that time, please let me know and we'll get you in sooner.

## 2024-06-14 ENCOUNTER — TELEPHONE (OUTPATIENT)
Dept: RHEUMATOLOGY | Facility: CLINIC | Age: 54
End: 2024-06-14
Payer: COMMERCIAL

## 2024-06-14 NOTE — TELEPHONE ENCOUNTER
----- Message from Melisa Wynn sent at 6/14/2024 12:51 PM CDT -----  Contact: pt  Type:  Test Results    Who Called:  pt  Name of Test (Lab/Mammo/Etc):  lab  Date of Test:  5/8  Ordering Provider:  christy  Where the [facility] performed[facility]  Would the patient rather a call back or a response via [facility]? phone  Best Call Back Number:  [phone]  Additional Information:

## 2024-06-14 NOTE — TELEPHONE ENCOUNTER
----- Message from Melisa Wynn sent at 6/14/2024 12:51 PM CDT -----  Contact: pt  Type:  Test Results    Who Called:  pt  Name of Test (Lab/Mammo/Etc):  lab  Date of Test:  5/8  Ordering Provider:  christy  Where the test was performed:  [facility]  Would the patient rather a call back or a response via [facility]? phone  Best Call Back Number:  [phone]  Additional Information:

## 2024-06-17 ENCOUNTER — TELEPHONE (OUTPATIENT)
Dept: RHEUMATOLOGY | Facility: CLINIC | Age: 54
End: 2024-06-17
Payer: COMMERCIAL

## 2024-06-17 NOTE — TELEPHONE ENCOUNTER
----- Message from Kesha Menchaca sent at 6/17/2024  9:13 AM CDT -----  Contact: BEATRICE WADSWORTH [[id number]]  ..Type:  Test Results    Who Called: BEATRICE WADSWORTH [[id number]]  Name of Test (Lab/Mammo/Etc):  Lab  Date of Test:  5/8/24  Ordering Provider:   Where the test was performed:  [facility]  Would the patient rather a call back or a response via [facility]?  call  Best Call Back Number:  [phone] (home)   Additional Information:

## 2024-06-17 NOTE — TELEPHONE ENCOUNTER
Kesha Menchaca Staff  Caller: BEATRICE WADSWORTH [[id number]] (Today,  9:13 AM)  ..Type:  Test Results    Who Called: BEATRICE WADSWORTH [[id number]]  Name of Test (Lab/Mammo/Etc):  Lab  Date of Test:  5/8/24  Ordering Provider:  Where the test was performed:  [facility]  Would the patient rather a call back or a response via [facility]?  call  Best Call Back Number:  [phone] (home)  Additional Information:

## 2024-06-20 ENCOUNTER — TELEPHONE (OUTPATIENT)
Dept: RHEUMATOLOGY | Facility: CLINIC | Age: 54
End: 2024-06-20
Payer: COMMERCIAL

## 2024-06-20 NOTE — TELEPHONE ENCOUNTER
Ms. Mcclure, labs look good! No evidence of lupus or other rheumatic disease. Let's do a 1-year follow up with lab work and office visit to monitor. If you develop any concerning symptoms within that time, please let me know and we'll get you in sooner.     I will schedule office visit a week after 05/14/2025.

## 2024-08-29 ENCOUNTER — OFFICE VISIT (OUTPATIENT)
Dept: INTERNAL MEDICINE | Facility: CLINIC | Age: 54
End: 2024-08-29
Payer: COMMERCIAL

## 2024-08-29 VITALS
HEART RATE: 92 BPM | WEIGHT: 185.44 LBS | RESPIRATION RATE: 20 BRPM | SYSTOLIC BLOOD PRESSURE: 118 MMHG | TEMPERATURE: 98 F | DIASTOLIC BLOOD PRESSURE: 72 MMHG | HEIGHT: 66 IN | BODY MASS INDEX: 29.8 KG/M2 | OXYGEN SATURATION: 96 %

## 2024-08-29 DIAGNOSIS — I10 ESSENTIAL HYPERTENSION: ICD-10-CM

## 2024-08-29 DIAGNOSIS — Z13.1 SCREENING FOR DIABETES MELLITUS: ICD-10-CM

## 2024-08-29 DIAGNOSIS — K76.0 FATTY LIVER: ICD-10-CM

## 2024-08-29 DIAGNOSIS — Z76.89 ENCOUNTER TO ESTABLISH CARE: Primary | ICD-10-CM

## 2024-08-29 PROCEDURE — 3008F BODY MASS INDEX DOCD: CPT | Mod: CPTII,S$GLB,, | Performed by: INTERNAL MEDICINE

## 2024-08-29 PROCEDURE — 4010F ACE/ARB THERAPY RXD/TAKEN: CPT | Mod: CPTII,S$GLB,, | Performed by: INTERNAL MEDICINE

## 2024-08-29 PROCEDURE — 99204 OFFICE O/P NEW MOD 45 MIN: CPT | Mod: S$GLB,,, | Performed by: INTERNAL MEDICINE

## 2024-08-29 PROCEDURE — 1160F RVW MEDS BY RX/DR IN RCRD: CPT | Mod: CPTII,S$GLB,, | Performed by: INTERNAL MEDICINE

## 2024-08-29 PROCEDURE — 1159F MED LIST DOCD IN RCRD: CPT | Mod: CPTII,S$GLB,, | Performed by: INTERNAL MEDICINE

## 2024-08-29 PROCEDURE — 3078F DIAST BP <80 MM HG: CPT | Mod: CPTII,S$GLB,, | Performed by: INTERNAL MEDICINE

## 2024-08-29 PROCEDURE — 3074F SYST BP LT 130 MM HG: CPT | Mod: CPTII,S$GLB,, | Performed by: INTERNAL MEDICINE

## 2024-08-29 PROCEDURE — 99999 PR PBB SHADOW E&M-EST. PATIENT-LVL IV: CPT | Mod: PBBFAC,,, | Performed by: INTERNAL MEDICINE

## 2024-08-29 NOTE — PROGRESS NOTES
Nallely Mcclure  54 y.o. Black or  female  [id number]    Chief Complaint:  Chief Complaint   Patient presents with    \[facility]\"" Care       History of Present Illness:  New patient to me.   HTN--stable. Compliant with lisinopril-HCTZ.   Taking atorvastatin nightly but states her cholesterol was never over 200. She would like to have labs.   Fatty liver--denies symptoms. Denies drinking alcohol.     Laboratory   Lab Results   Component Value Date    WBC 6.61 04/03/2024    HGB 13.2 04/03/2024    HCT 40.7 04/03/2024     04/03/2024    CHOL 158 11/12/2020    TRIG 44 11/12/2020    HDL 54 11/12/2020    ALT 18 05/08/2024    AST 21 05/08/2024     05/08/2024    K 3.8 05/08/2024     05/08/2024    CREATININE 1.0 05/08/2024    BUN 11 05/08/2024    CO2 25 05/08/2024    TSH 1.959 03/09/2020    HGBA1C 5.3 11/12/2020     Review of Systems   Constitutional:  Negative for fever, malaise/fatigue and weight loss.   HENT:  Negative for hearing loss.    Eyes:  Negative for blurred vision.   Respiratory:  Negative for cough and shortness of breath.    Cardiovascular:  Positive for chest pain (on occasion--sharp and quick). Negative for palpitations and leg swelling.   Gastrointestinal:  Positive for constipation. Negative for abdominal pain, blood in stool and diarrhea.   Genitourinary:  Negative for dysuria.   Musculoskeletal:  Negative for back pain and joint pain.   Skin:  Positive for rash (face).   Neurological:  Negative for dizziness, weakness and headaches.   Psychiatric/Behavioral:  Negative for depression. The patient is not nervous/anxious and does not have insomnia.        The following were reviewed: Active problem list, medication list, allergies, family history, social history, and Health Maintenance.     History:  Past Medical History:   Diagnosis Date    Anemia, unspecified     Fatty liver disease, nonalcoholic     Fibroids     Hypertension      Past Surgical History:   Procedure  Laterality Date    BREAST BIOPSY  11/2005    COLONOSCOPY  09/2021    no polyps. recall 10 years    HYSTERECTOMY  2019    OOPHORECTOMY      ABE with LS&O  06/20/2019     Family History   Problem Relation Name Age of Onset    Hypertension Mother      Pancreatic cancer Father      Hypertension Sister      Breast cancer Paternal Aunt      Colon cancer Other AUNT     Breast cancer Other AUNT     Ovarian cancer Other AUNT     Diabetes Other       Social History     Socioeconomic History    Marital status: Single   Tobacco Use    Smoking status: Never    Smokeless tobacco: Never   Substance and Sexual Activity    Alcohol use: Yes    Drug use: Never    Sexual activity: Yes     Patient Active Problem List   Diagnosis    Epigastric pain    Slow transit constipation     Review of patient's allergies indicates:   Allergen Reactions    Adhesive      Other reaction(s): Rash, leaves marked impression on area of contact       Health Maintenance  Health Maintenance Topics with due status: Not Due       Topic Last Completion Date    Lipid Panel 11/12/2020    Colorectal Cancer Screening 08/28/2024    Mammogram 08/28/2024    Influenza Vaccine Not Due     Health Maintenance Due   Topic Date Due    Pneumococcal Vaccines (Age 0-64) (1 of 2 - PCV) Never done    TETANUS VACCINE  04/08/2014    Shingles Vaccine (1 of 2) Never done    COVID-19 Vaccine (1 - 2023-24 season) Never done    Hemoglobin A1c (Diabetic Prevention Screening)  11/12/2023       Medications:  Current Outpatient Medications on File Prior to Visit   Medication Sig Dispense Refill    atorvastatin (LIPITOR) 10 MG tablet Take 10 mg by mouth once daily.      cholecalciferol, vitamin D3, (VITAMIN D3) 50 mcg (2,000 unit) Cap capsule Take 1 capsule by mouth once daily.      lisinopriL-hydrochlorothiazide (PRINZIDE,ZESTORETIC) 20-12.5 mg per tablet Take 1 tablet by mouth.      multivitamin (THERAGRAN) per tablet Take 1 tablet by mouth once daily.      omega-3 fatty acids/fish oil  (FISH OIL-OMEGA-3 FATTY ACIDS) 300-1,000 mg capsule Take by mouth once daily.      tacrolimus (PROTOPIC) 0.1 % ointment AAA bid prn rash of face/body.  Non-steroid. Safe to use long-term 60 g 11    triamcinolone acetonide 0.025% (KENALOG) 0.025 % Oint AAA bid prn rashes on face. Mild steroid. (Patient not taking: Reported on 8/29/2024) 80 g 1     Medications have been reviewed and reconciled with patient at visit today.    Exam:  Vitals:    08/29/24 1514   BP: 118/72   Pulse: 92   Resp: 20   Temp: 97.5 °F (36.4 °C)     Weight: 84.1 kg (185 lb 6.5 oz)   Body mass index is 29.93 kg/m².      Physical Exam  Vitals reviewed.   Constitutional:       General: She is not in acute distress.     Appearance: She is well-developed. She is not ill-appearing.   Eyes:      General: No scleral icterus.  Cardiovascular:      Rate and Rhythm: Normal rate and regular rhythm.      Heart sounds: Murmur heard.   Pulmonary:      Effort: Pulmonary effort is normal. No respiratory distress.      Breath sounds: Normal breath sounds. No wheezing or rales.   Abdominal:      General: Bowel sounds are normal.      Palpations: Abdomen is soft.   Musculoskeletal:      Right lower leg: No edema.      Left lower leg: No edema.   Skin:     General: Skin is warm and dry.   Neurological:      Mental Status: She is alert and oriented to person, place, and time.   Psychiatric:         Behavior: Behavior normal.       Assessment:  The primary encounter diagnosis was Encounter to establish care. Diagnoses of Essential hypertension, Fatty liver, and Screening for diabetes mellitus were also pertinent to this visit.    Plan:  Encounter to establish care  -     Counseled regarding age appropriate screenings and immunizations  -     Counseled regarding lifestyle modifications    Essential hypertension  -     Continue lisinopril-HCTZ  -     Comprehensive Metabolic Panel; Future; Expected date: 08/29/2024  -     Lipid panel; Future; Expected date: 08/29/2024  -      CBC Auto Differential; Future; Expected date: 08/29/2024    Fatty liver  - Lifestyle modifications discussed    Screening for diabetes mellitus  -     Hemoglobin A1C; Future; Expected date: 11/28/2024    Schedule labs.

## 2024-08-29 NOTE — LETTER
August 29, 2024    Nallely Mcclure  [address] [facility]  [place] [address]             [facility]  Internal Medicine  [address] [place] [address] [address] [place] [address]  Phone: [phone]  Fax: [phone]   August 29, 2024     Patient: Nallely Mcclure   YOB: 1970   Date of Visit: 8/29/2024       To Whom it May Concern:    Nallely Mcclure was seen in my clinic on 8/29/2024. She may return to work on 08/30/2024 .    Please excuse her from any classes or work missed.    If you have any questions or concerns, please don't hesitate to call.    Sincerely,         Fransisca Chen, DO

## 2024-09-17 ENCOUNTER — TELEPHONE (OUTPATIENT)
Dept: DERMATOLOGY | Facility: CLINIC | Age: 54
End: 2024-09-17
Payer: COMMERCIAL

## 2024-09-17 NOTE — TELEPHONE ENCOUNTER
Attempted to call to f/u. No answer. Unable to leave VM due to box being full. Portal message sent instead.   ----- Message from Hyacinth Helm MD sent at 9/17/2024  8:50 AM CDT -----  Will discuss with her today  ----- Message -----  From: Stephanie Last RN  Sent: 9/13/2024   3:34 PM CDT  To: Hyacinth Helm MD    Called pt to confirm appt on Tuesday. She reports no change in the hypopigmentation around the mouth using protopic/vanicream. She reports not using the triamcinolone cream in fear it may cause more skin discoloration as a side effect. Pt asking if she should try cerave facial wash/moisturizer?     Please advise.

## 2024-09-27 ENCOUNTER — LAB VISIT (OUTPATIENT)
Dept: LAB | Facility: HOSPITAL | Age: 54
End: 2024-09-27
Attending: INTERNAL MEDICINE
Payer: COMMERCIAL

## 2024-09-27 DIAGNOSIS — I10 ESSENTIAL HYPERTENSION: ICD-10-CM

## 2024-09-27 LAB
ALBUMIN SERPL BCP-MCNC: 3.7 G/DL (ref 3.5–5.2)
ALP SERPL-CCNC: 111 U/L (ref 55–135)
ALT SERPL W/O P-5'-P-CCNC: 21 U/L (ref 10–44)
ANION GAP SERPL CALC-SCNC: 6 MMOL/L (ref 8–16)
AST SERPL-CCNC: 25 U/L (ref 10–40)
BASOPHILS # BLD AUTO: 0.04 K/UL (ref 0–0.2)
BASOPHILS NFR BLD: 0.8 % (ref 0–1.9)
BILIRUB SERPL-MCNC: 0.7 MG/DL (ref 0.1–1)
BUN SERPL-MCNC: 12 MG/DL (ref 6–20)
CALCIUM SERPL-MCNC: 9.4 MG/DL (ref 8.7–10.5)
CHLORIDE SERPL-SCNC: 106 MMOL/L (ref 95–110)
CHOLEST SERPL-MCNC: 115 MG/DL (ref 120–199)
CHOLEST/HDLC SERPL: 2.7 {RATIO} (ref 2–5)
CO2 SERPL-SCNC: 27 MMOL/L (ref 23–29)
CREAT SERPL-MCNC: 1 MG/DL (ref 0.5–1.4)
DIFFERENTIAL METHOD BLD: ABNORMAL
EOSINOPHIL # BLD AUTO: 0.2 K/UL (ref 0–0.5)
EOSINOPHIL NFR BLD: 4.4 % (ref 0–8)
ERYTHROCYTE [DISTWIDTH] IN BLOOD BY AUTOMATED COUNT: 13 % (ref 11.5–14.5)
EST. GFR  (NO RACE VARIABLE): >60 ML/MIN/1.73 M^2
GLUCOSE SERPL-MCNC: 95 MG/DL (ref 70–110)
HCT VFR BLD AUTO: 39.9 % (ref 37–48.5)
HDLC SERPL-MCNC: 43 MG/DL (ref 40–75)
HDLC SERPL: 37.4 % (ref 20–50)
HGB BLD-MCNC: 12.8 G/DL (ref 12–16)
IMM GRANULOCYTES # BLD AUTO: 0.01 K/UL (ref 0–0.04)
IMM GRANULOCYTES NFR BLD AUTO: 0.2 % (ref 0–0.5)
LDLC SERPL CALC-MCNC: 66 MG/DL (ref 63–159)
LYMPHOCYTES # BLD AUTO: 2.7 K/UL (ref 1–4.8)
LYMPHOCYTES NFR BLD: 51 % (ref 18–48)
MCH RBC QN AUTO: 27.1 PG (ref 27–31)
MCHC RBC AUTO-ENTMCNC: 32.1 G/DL (ref 32–36)
MCV RBC AUTO: 85 FL (ref 82–98)
MONOCYTES # BLD AUTO: 0.5 K/UL (ref 0.3–1)
MONOCYTES NFR BLD: 8.8 % (ref 4–15)
NEUTROPHILS # BLD AUTO: 1.8 K/UL (ref 1.8–7.7)
NEUTROPHILS NFR BLD: 34.8 % (ref 38–73)
NONHDLC SERPL-MCNC: 72 MG/DL
NRBC BLD-RTO: 0 /100 WBC
PLATELET # BLD AUTO: 277 K/UL (ref 150–450)
PMV BLD AUTO: 11.5 FL (ref 9.2–12.9)
POTASSIUM SERPL-SCNC: 4.1 MMOL/L (ref 3.5–5.1)
PROT SERPL-MCNC: 7.6 G/DL (ref 6–8.4)
RBC # BLD AUTO: 4.72 M/UL (ref 4–5.4)
SODIUM SERPL-SCNC: 139 MMOL/L (ref 136–145)
TRIGL SERPL-MCNC: 30 MG/DL (ref 30–150)
WBC # BLD AUTO: 5.22 K/UL (ref 3.9–12.7)

## 2024-09-27 PROCEDURE — 80053 COMPREHEN METABOLIC PANEL: CPT | Performed by: INTERNAL MEDICINE

## 2024-09-27 PROCEDURE — 36415 COLL VENOUS BLD VENIPUNCTURE: CPT | Performed by: INTERNAL MEDICINE

## 2024-09-27 PROCEDURE — 80061 LIPID PANEL: CPT | Performed by: INTERNAL MEDICINE

## 2024-09-27 PROCEDURE — 85025 COMPLETE CBC W/AUTO DIFF WBC: CPT | Performed by: INTERNAL MEDICINE

## 2024-10-04 ENCOUNTER — TELEPHONE (OUTPATIENT)
Dept: INTERNAL MEDICINE | Facility: CLINIC | Age: 54
End: 2024-10-04
Payer: COMMERCIAL

## 2024-10-04 NOTE — TELEPHONE ENCOUNTER
Returning patient call with regard to her lab test result and rx refill request. No answer. Mailbox is full.

## 2024-10-04 NOTE — TELEPHONE ENCOUNTER
----- Message from Shakira sent at 10/4/2024  1:37 PM CDT -----  Type:  Test Results    Who Called:  Pt  Name of Test (Lab/Mammo/Etc):  Lab  Date of Test:  9/27  Ordering Provider:  Dr. Chen  Where the test was performed:  [facility]  Would the patient rather a call back or a response via [facility]? Call  Best Call Back Number:   [phone]  Additional Information:   Pt would like to speak to provider related to lab results (concerning Cholesterol medication) and medication refill.  lisinopriL-hydrochlorothiazide (PRINZIDE,ZESTORETIC) 20-12.5 mg per tablet, Please send prescription to [facility] [address] [place] [address] [address] [facility]

## 2024-10-07 DIAGNOSIS — I10 ESSENTIAL HYPERTENSION: Primary | ICD-10-CM

## 2024-10-07 DIAGNOSIS — E78.5 HYPERLIPIDEMIA LDL GOAL <130: ICD-10-CM

## 2024-10-07 NOTE — TELEPHONE ENCOUNTER
Patient asking if she need to continue taking the Doxazocin based on her test result. Also, patient asking new rx to get refilled for htn. Please advise.

## 2024-10-08 NOTE — TELEPHONE ENCOUNTER
----- Message from Denis sent at 10/8/2024 11:52 AM CDT -----  Contact: Nallely  Type:  Test Results    Who Called: Nallely  Name of Test (Lab/Mammo/Etc): labs   Date of Test: 9/27/24  Ordering Provider:    Where the test was performed:  [facility]  Would the patient rather a call back or a response via [facility]?  Call back   Best Call Back Number: [phone]  Additional Information:      Thanks   Am

## 2024-10-08 NOTE — TELEPHONE ENCOUNTER
Patient called Friday 10/04/2024 the message we got from the phone room states   Additional Information:   Pt would like to speak to provider related to lab results (concerning Cholesterol medication) and medication refill.  lisinopril-hydrochlorothiazide (PRINZIDE,ZESTORETIC) 20-12.5 mg per tablet, Please send prescription to [facility] [address] [place] [address] [address] [facility]    patient was called but voice mail was full     Spoke to patient today told her results was sent to her via [employer] patient states she could not upload results   Notified patient that Dr Chen said results look fine and do not require any change in treatment patient asked if she should continue on the Atorvastatin I said she needs to continue whatever she was doing when she took the blood test patient is also worried that her cholesterol is lower than the range provided for cholesterol 120 - 199 her result is 115     I put in the request for a refill on Atorvastatin and Lisinopril I notified patient that refill request can take 2 to 3 business days but since she is out of BP medication I will send it high priority

## 2024-10-08 NOTE — TELEPHONE ENCOUNTER
No care due was identified.  [facility] Embedded Care Due Messages. Reference number: [id number].   10/08/2024 12:15:48 PM CDT

## 2024-10-09 ENCOUNTER — TELEPHONE (OUTPATIENT)
Dept: INTERNAL MEDICINE | Facility: CLINIC | Age: 54
End: 2024-10-09
Payer: COMMERCIAL

## 2024-10-09 RX ORDER — LISINOPRIL AND HYDROCHLOROTHIAZIDE 12.5; 2 MG/1; MG/1
1 TABLET ORAL DAILY
Qty: 90 TABLET | Refills: 3 | Status: SHIPPED | OUTPATIENT
Start: 2024-10-09

## 2024-10-09 RX ORDER — ATORVASTATIN CALCIUM 10 MG/1
10 TABLET, FILM COATED ORAL DAILY
Qty: 90 TABLET | Refills: 3 | Status: SHIPPED | OUTPATIENT
Start: 2024-10-09

## 2024-10-09 NOTE — TELEPHONE ENCOUNTER
Tried to call patient to let her know her refills for Atorvastatin and Lisinopril were sent to the pharmacy no answer and voice mail full

## 2024-11-21 ENCOUNTER — OFFICE VISIT (OUTPATIENT)
Dept: DERMATOLOGY | Facility: CLINIC | Age: 54
End: 2024-11-21
Payer: COMMERCIAL

## 2024-11-21 DIAGNOSIS — L71.0 PERIORIFICIAL DERMATITIS: Primary | ICD-10-CM

## 2024-11-21 PROCEDURE — 99214 OFFICE O/P EST MOD 30 MIN: CPT | Mod: S$GLB,,, | Performed by: STUDENT IN AN ORGANIZED HEALTH CARE EDUCATION/TRAINING PROGRAM

## 2024-11-21 PROCEDURE — G2211 COMPLEX E/M VISIT ADD ON: HCPCS | Mod: S$GLB,,, | Performed by: STUDENT IN AN ORGANIZED HEALTH CARE EDUCATION/TRAINING PROGRAM

## 2024-11-21 PROCEDURE — 1159F MED LIST DOCD IN RCRD: CPT | Mod: CPTII,S$GLB,, | Performed by: STUDENT IN AN ORGANIZED HEALTH CARE EDUCATION/TRAINING PROGRAM

## 2024-11-21 PROCEDURE — 4010F ACE/ARB THERAPY RXD/TAKEN: CPT | Mod: CPTII,S$GLB,, | Performed by: STUDENT IN AN ORGANIZED HEALTH CARE EDUCATION/TRAINING PROGRAM

## 2024-11-21 PROCEDURE — 99999 PR PBB SHADOW E&M-EST. PATIENT-LVL III: CPT | Mod: PBBFAC,,, | Performed by: STUDENT IN AN ORGANIZED HEALTH CARE EDUCATION/TRAINING PROGRAM

## 2024-11-21 PROCEDURE — 1160F RVW MEDS BY RX/DR IN RCRD: CPT | Mod: CPTII,S$GLB,, | Performed by: STUDENT IN AN ORGANIZED HEALTH CARE EDUCATION/TRAINING PROGRAM

## 2024-11-21 RX ORDER — METRONIDAZOLE 7.5 MG/G
CREAM TOPICAL 2 TIMES DAILY
Qty: 45 G | Refills: 2 | Status: SHIPPED | OUTPATIENT
Start: 2024-11-21 | End: 2025-11-21

## 2024-11-21 RX ORDER — DOXYCYCLINE HYCLATE 100 MG
100 TABLET ORAL DAILY
Qty: 90 TABLET | Refills: 0 | Status: SHIPPED | OUTPATIENT
Start: 2024-11-21

## 2024-11-21 NOTE — LETTER
November 21, 2024      [facility] 4th Floor  [address] [facility]  [place] [address]  Phone: [phone]  Fax: [phone]       Patient: Nallely Mcclure   YOB: 1970  Date of Visit: 11/21/2024    To Whom It May Concern:    Steven Mcclure  was at [facility] on 11/21/2024. The patient may return to work on 11/22/2024 with no restrictions. If you have any questions or concerns, or if I can be of further assistance, please do not hesitate to contact me.    Sincerely,    Stephanie Last RN

## 2024-11-22 NOTE — PROGRESS NOTES
Subjective:       Patient ID:  Nallely Mcclure is a 54 y.o. female who presents for   Chief Complaint   Patient presents with    Skin Discoloration     C/o facial discoloration currently given tacrolimus ointment however no improvement.      History of Present Illness: The patient presents for follow up of a rash on the face around the nose and mouth, last seen by Dr. Helm on 4/3/24. Initially given doxycycline, but did not take and then prescribed topical tacrolimus which she has been using since last visit. Reports minimal improvement in symptoms. Labs were done, showing a positive GODWIN, though further workup was negative for autoimmune conditions. She reports continuing to develop itchy, burning and irritating bumps and rash on the face, mostly around the mouth and nose, but now starting to get some around the eyebrows. Using tacrolimus and gentle moisturizers on the face.       Skin Discoloration        Review of Systems   Constitutional:  Negative for fever and chills.        Objective:    Physical Exam   Constitutional: She appears well-developed and well-nourished. No distress.   Neurological: She is alert and oriented to person, place, and time. She is not disoriented.   Psychiatric: She has a normal mood and affect.   Skin:   Areas Examined (abnormalities noted in diagram):   Head / Face Inspection Performed  Neck Inspection Performed              Diagram Legend     Erythematous scaling macule/papule c/w actinic keratosis       Vascular papule c/w angioma      Pigmented verrucoid papule/plaque c/w seborrheic keratosis      Yellow umbilicated papule c/w sebaceous hyperplasia      Irregularly shaped tan macule c/w lentigo     1-2 mm smooth white papules consistent with Milia      Movable subcutaneous cyst with punctum c/w epidermal inclusion cyst      Subcutaneous movable cyst c/w pilar cyst      Firm pink to brown papule c/w dermatofibroma      Pedunculated fleshy papule(s) c/w skin tag(s)      Evenly  pigmented macule c/w junctional nevus     Mildly variegated pigmented, slightly irregular-bordered macule c/w mildly atypical nevus      Flesh colored to evenly pigmented papule c/w intradermal nevus       Pink pearly papule/plaque c/w basal cell carcinoma      Erythematous hyperkeratotic cursted plaque c/w SCC      Surgical scar with no sign of skin cancer recurrence      Open and closed comedones      Inflammatory papules and pustules      Verrucoid papule consistent consistent with wart     Erythematous eczematous patches and plaques     Dystrophic onycholytic nail with subungual debris c/w onychomycosis     Umbilicated papule    Erythematous-base heme-crusted tan verrucoid plaque consistent with inflamed seborrheic keratosis     Erythematous Silvery Scaling Plaque c/w Psoriasis     See annotation      Assessment / Plan:        Periorificial dermatitis - clinically consistent with above. Okay to stop tacrolimus for now. Will start doxycycline and topical metronidazole.   -     doxycycline (VIBRA-TABS) 100 MG tablet; Take 1 tablet (100 mg total) by mouth once daily.  Dispense: 90 tablet; Refill: 0  -     metronidazole 0.75% (METROCREAM) 0.75 % Crea; Apply topically 2 (two) times daily.  Dispense: 45 g; Refill: 2  -     Counseled patient on gentle skin care regimen, including need for sensitive soaps/detergents, as well as need for frequent use of sensitize moisturizers.          Follow up in about 3 months (around 2/21/2025).

## 2024-12-02 ENCOUNTER — TELEPHONE (OUTPATIENT)
Dept: DERMATOLOGY | Facility: CLINIC | Age: 54
End: 2024-12-02
Payer: COMMERCIAL

## 2024-12-02 NOTE — TELEPHONE ENCOUNTER
Called pt regarding callback. Pt states going to urgent care due to having an  insect bite that has grown in size rapidly over the course of a week. Pt was given Cephalexin 500mg to take. Pt is asking if it is safe to use both Doxy given at her appointment with Dr.Jaren Robles on 11/21/2024 and the cephalexin. Message sent to provider. Pt verbalized understanding.     ----- Message from Mariah sent at 12/2/2024  7:11 AM CST -----  Contact: [phone]  .[email or web address]     Patient is calling for Medical Advice regarding:has a question     How long has patient had these symptoms:    Pharmacy name and phone#:    Patient wants a call back or thru [facility]:call back     Comments:  Pt is calling she states she has a question for the dr she states she went to  last night she states they gave her antibiotics and needs to know if she should take this   Cephlexin 500 mg and Mupirocin 2 percent  please advise   Please advise patient replies from provider may take up to 48 hours.

## 2024-12-03 ENCOUNTER — TELEPHONE (OUTPATIENT)
Dept: DERMATOLOGY | Facility: CLINIC | Age: 54
End: 2024-12-03
Payer: COMMERCIAL

## 2024-12-03 ENCOUNTER — PATIENT MESSAGE (OUTPATIENT)
Dept: DERMATOLOGY | Facility: CLINIC | Age: 54
End: 2024-12-03
Payer: COMMERCIAL

## 2024-12-03 NOTE — TELEPHONE ENCOUNTER
Attempted to call and notify patient of info below. No answer. Message left to return call. Pt was also sent portal message.   ----- Message from Mg Jeronimo MD sent at 12/3/2024  6:43 AM CST -----  Regarding: RE: medication  Contact: [phone]  Okay to stop the doxycycline at this time and take just the Cephalexin. Once completed that course of antibiotics, okay to then get back on the doxycycline.  ----- Message -----  From: Soni Campos MA  Sent: 12/2/2024   4:18 PM CST  To: Mg Jeronimo MD  Subject: medication                                       Called pt regarding callback. Pt states going to urgent care due to having an  insect bite that has grown in size rapidly over the course of a week. Pt was given Cephalexin 500mg to take. Pt is asking if it is safe to use both Doxy given at her appointment with Dr.Jaren Robles on 11/21/2024 and the cephalexin. Message sent to provider. Pt verbalized understanding.  ----- Message -----  From: Mariah Larios  Sent: 12/2/2024   7:14 AM CST  To: Phani Holliday Staff    .[email or web address]     Patient is calling for Medical Advice regarding:has a question     How long has patient had these symptoms:    Pharmacy name and phone#:    Patient wants a call back or thru [facility]:call back     Comments:  Pt is calling she states she has a question for the dr she states she went to  last night she states they gave her antibiotics and needs to know if she should take this   Cephlexin 500 mg and Mupirocin 2 percent  please advise   Please advise patient replies from provider may take up to 48 hours.

## 2024-12-05 ENCOUNTER — OFFICE VISIT (OUTPATIENT)
Dept: INTERNAL MEDICINE | Facility: CLINIC | Age: 54
End: 2024-12-05
Payer: COMMERCIAL

## 2024-12-05 ENCOUNTER — HOSPITAL ENCOUNTER (OUTPATIENT)
Dept: RADIOLOGY | Facility: HOSPITAL | Age: 54
Discharge: HOME | End: 2024-12-05
Attending: PHYSICIAN ASSISTANT
Payer: COMMERCIAL

## 2024-12-05 VITALS
TEMPERATURE: 98 F | BODY MASS INDEX: 31.35 KG/M2 | WEIGHT: 194.25 LBS | OXYGEN SATURATION: 98 % | HEART RATE: 75 BPM | RESPIRATION RATE: 21 BRPM | DIASTOLIC BLOOD PRESSURE: 86 MMHG | SYSTOLIC BLOOD PRESSURE: 130 MMHG

## 2024-12-05 DIAGNOSIS — L98.9 FACIAL SKIN LESION: ICD-10-CM

## 2024-12-05 DIAGNOSIS — I10 HYPERTENSION, UNSPECIFIED TYPE: ICD-10-CM

## 2024-12-05 DIAGNOSIS — L03.211 CELLULITIS OF FACE: Primary | ICD-10-CM

## 2024-12-05 PROCEDURE — 76536 US EXAM OF HEAD AND NECK: CPT | Mod: 26,,, | Performed by: RADIOLOGY

## 2024-12-05 PROCEDURE — 99999 PR PBB SHADOW E&M-EST. PATIENT-LVL V: CPT | Mod: PBBFAC,,, | Performed by: PHYSICIAN ASSISTANT

## 2024-12-05 PROCEDURE — 76536 US EXAM OF HEAD AND NECK: CPT | Mod: TC

## 2024-12-05 RX ORDER — SULFAMETHOXAZOLE AND TRIMETHOPRIM 800; 160 MG/1; MG/1
1 TABLET ORAL 2 TIMES DAILY
Qty: 14 TABLET | Refills: 0 | Status: SHIPPED | OUTPATIENT
Start: 2024-12-05 | End: 2024-12-05

## 2024-12-05 RX ORDER — LINACLOTIDE 145 UG/1
145 CAPSULE, GELATIN COATED ORAL NIGHTLY
COMMUNITY
Start: 2024-12-04

## 2024-12-05 RX ORDER — KETOROLAC TROMETHAMINE 10 MG/1
10 TABLET, FILM COATED ORAL EVERY 8 HOURS PRN
COMMUNITY

## 2024-12-05 RX ORDER — AMOXICILLIN 875 MG/1
875 TABLET, FILM COATED ORAL EVERY 12 HOURS
Qty: 14 TABLET | Refills: 0 | Status: SHIPPED | OUTPATIENT
Start: 2024-12-05

## 2024-12-05 RX ORDER — CEPHALEXIN 500 MG/1
500 CAPSULE ORAL EVERY 12 HOURS
COMMUNITY
End: 2024-12-05

## 2024-12-05 RX ORDER — MUPIROCIN 20 MG/G
OINTMENT TOPICAL
COMMUNITY

## 2024-12-05 RX ORDER — DOXYCYCLINE HYCLATE 100 MG
100 TABLET ORAL EVERY 12 HOURS
Qty: 14 TABLET | Refills: 0 | Status: SHIPPED | OUTPATIENT
Start: 2024-12-05 | End: 2024-12-12

## 2024-12-05 NOTE — LETTER
December 5, 2024      [facility]  [address] [place] [address] [address] [place] [address]  Phone: [phone]  Fax: [phone]       Patient: Nallely Mcclure   YOB: 1970  Date of Visit: 12/05/2024    To Whom It May Concern:    Steven Mcclure  was at [facility] on 12/05/2024. The patient may return to work/school on 12/5/24 with no restrictions. If you have any questions or concerns, or if I can be of further assistance, please do not hesitate to contact me.    Sincerely,    Melisa Choudhary PA-C

## 2024-12-05 NOTE — PROGRESS NOTES
Subjective:      Patient ID: Nallely Mcclure is a 54 y.o. female.    Chief Complaint: Follow-up (She is here for a follow up from an urgent care visit (12/1), has a spot on her face that she states is not getting any better.)    Patient is new to me, being seen today for Urgent Care follow up.     Started w lesion to face 11/26, thought it may be pimple/insect bite   +itchy   Initially w some purulent drainage   Treatment includes dove soup, neosporin, hot compresses   Denies new exposures     Eval in Urgent Care 12/1  Rx: Keflex (500mg bid) and bactroban   On abx x3 days, bactroban seems to burn area   Worse since starting abx  Area is now tender to touch and swelling has worsened     Has previously seen Derm 11/21 for separate issue (hypopigmented, flesh colored papules, around face and mouth)  Dx: perioral dermatitis   Rx: Doxy and metrogel   Took meds x1day but stopped when she was prescribed keflex     Also of note   EGD 11/22 for constipation/bloating, started on linzess   Dx: gastritis/constipation    Last visit Aug 2024 w PCP.       Review of Systems   Constitutional:  Negative for chills, diaphoresis and fever.   HENT:  Positive for facial swelling. Negative for congestion, dental problem, rhinorrhea and sore throat.         Recent dental visit for routine cleaning ~Sept/Oct  Root canal L sided months ago  Denies known dental caries or dental pain   Respiratory:  Negative for cough, shortness of breath and wheezing.    Gastrointestinal:  Negative for abdominal pain, constipation, diarrhea, nausea and vomiting.   Skin:  Negative for rash.   Neurological:  Negative for dizziness, light-headedness and headaches.       Objective:   /86 (BP Location: Right arm, Patient Position: Sitting)   Pulse 75   Temp 97.6 °F (36.4 °C) (Tympanic)   Resp (!) 21   Wt 88.1 kg (194 lb 3.6 oz)   SpO2 98%   BMI 31.35 kg/m²   Physical Exam  Constitutional:       General: She is not in acute distress.     Appearance:  She is well-developed. She is not ill-appearing or diaphoretic.   HENT:      Head: Normocephalic and atraumatic.        Right Ear: Hearing, tympanic membrane, ear canal and external ear normal.      Left Ear: Hearing, tympanic membrane, ear canal and external ear normal.      Nose: Nose normal.      Mouth/Throat:      Mouth: Mucous membranes are moist. No oral lesions.      Pharynx: Oropharynx is clear.   Eyes:      General: Lids are normal.         Right eye: No discharge.         Left eye: No discharge.      Conjunctiva/sclera: Conjunctivae normal.      Right eye: Right conjunctiva is not injected.      Left eye: Left conjunctiva is not injected.   Pulmonary:      Effort: Pulmonary effort is normal. No respiratory distress.   Lymphadenopathy:      Head:      Right side of head: Submandibular adenopathy present. No submental adenopathy.      Cervical: No cervical adenopathy.   Skin:     General: Skin is warm and dry.      Findings: No rash.   Neurological:      Mental Status: She is alert and oriented to person, place, and time.   Psychiatric:         Speech: Speech normal.         Behavior: Behavior normal.         Thought Content: Thought content normal.         Judgment: Judgment normal.             Assessment:      1. Cellulitis of face    2. Hypertension, unspecified type    3. Facial skin lesion       Plan:   Cellulitis of face  -     doxycycline (VIBRA-TABS) 100 MG tablet; Take 1 tablet (100 mg total) by mouth every 12 (twelve) hours. for 7 days  Dispense: 14 tablet; Refill: 0  -     amoxicillin (AMOXIL) 875 MG tablet; Take 1 tablet (875 mg total) by mouth every 12 (twelve) hours.  Dispense: 14 tablet; Refill: 0    Hypertension, unspecified type    Facial skin lesion  -      Soft Tissue Head Neck; Future; Expected date: 12/05/2024  -     doxycycline (VIBRA-TABS) 100 MG tablet; Take 1 tablet (100 mg total) by mouth every 12 (twelve) hours. for 7 days  Dispense: 14 tablet; Refill: 0  -     amoxicillin  (AMOXIL) 875 MG tablet; Take 1 tablet (875 mg total) by mouth every 12 (twelve) hours.  Dispense: 14 tablet; Refill: 0      Avoid oral NSAIDs, Tylenol and cool compresses for pain     Will send eConsult to Derm but will cover staph/strep with anove regimen   D/c keflex and bactroban (possibly erysipelas vs cellulitis)    Take antibiotics for entire course.  Consider yogurt or probiotic while on antibiotic to prevent GI upset.    Do not save medications for later, all medications must be taken for full regimen.    Discussed worsening signs/symptoms and when to return to clinic or go to ED.   Patient expresses understanding and agrees with treatment plan.

## 2024-12-05 NOTE — LETTER
December 5, 2024      [facility]  [address] [place] [address] [address] [place] [address]  Phone: [phone]  Fax: [phone]       Patient: Nallely Mcclure   YOB: 1970  Date of Visit: 12/05/2024    To Whom It May Concern:    Steven Mcclure  was at [facility] on 12/05/2024. The patient may return to work/school on 12/6/24 with no restrictions. If you have any questions or concerns, or if I can be of further assistance, please do not hesitate to contact me.    Sincerely,    Melisa Choudhary PA-C

## 2024-12-08 ENCOUNTER — PATIENT MESSAGE (OUTPATIENT)
Dept: INTERNAL MEDICINE | Facility: CLINIC | Age: 54
End: 2024-12-08
Payer: COMMERCIAL

## 2024-12-09 ENCOUNTER — OFFICE VISIT (OUTPATIENT)
Dept: INTERNAL MEDICINE | Facility: CLINIC | Age: 54
End: 2024-12-09
Payer: COMMERCIAL

## 2024-12-09 ENCOUNTER — HOSPITAL ENCOUNTER (OUTPATIENT)
Facility: HOSPITAL | Age: 54
Discharge: HOME | End: 2024-12-11
Attending: EMERGENCY MEDICINE | Admitting: HOSPITALIST
Payer: COMMERCIAL

## 2024-12-09 VITALS
RESPIRATION RATE: 20 BRPM | OXYGEN SATURATION: 99 % | BODY MASS INDEX: 29.5 KG/M2 | HEART RATE: 103 BPM | TEMPERATURE: 98 F | SYSTOLIC BLOOD PRESSURE: 136 MMHG | DIASTOLIC BLOOD PRESSURE: 86 MMHG | WEIGHT: 182.75 LBS

## 2024-12-09 DIAGNOSIS — L03.211 CELLULITIS OF FACE: Primary | ICD-10-CM

## 2024-12-09 DIAGNOSIS — R07.9 CHEST PAIN: ICD-10-CM

## 2024-12-09 DIAGNOSIS — T78.40XA ALLERGIC REACTION, INITIAL ENCOUNTER: ICD-10-CM

## 2024-12-09 DIAGNOSIS — L29.9 ITCHING: ICD-10-CM

## 2024-12-09 LAB
ALBUMIN SERPL BCP-MCNC: 4.6 G/DL (ref 3.5–5.2)
ALP SERPL-CCNC: 94 U/L (ref 40–150)
ALT SERPL W/O P-5'-P-CCNC: 23 U/L (ref 10–44)
ANION GAP SERPL CALC-SCNC: 13 MMOL/L (ref 8–16)
AST SERPL-CCNC: 29 U/L (ref 10–40)
BASOPHILS # BLD AUTO: 0.04 K/UL (ref 0–0.2)
BASOPHILS NFR BLD: 0.6 % (ref 0–1.9)
BILIRUB SERPL-MCNC: 0.9 MG/DL (ref 0.1–1)
BUN SERPL-MCNC: 8 MG/DL (ref 6–20)
CALCIUM SERPL-MCNC: 10.7 MG/DL (ref 8.7–10.5)
CHLORIDE SERPL-SCNC: 103 MMOL/L (ref 95–110)
CO2 SERPL-SCNC: 22 MMOL/L (ref 23–29)
CREAT SERPL-MCNC: 1.3 MG/DL (ref 0.5–1.4)
DIFFERENTIAL METHOD BLD: NORMAL
EOSINOPHIL # BLD AUTO: 0.2 K/UL (ref 0–0.5)
EOSINOPHIL NFR BLD: 2.4 % (ref 0–8)
ERYTHROCYTE [DISTWIDTH] IN BLOOD BY AUTOMATED COUNT: 12.9 % (ref 11.5–14.5)
EST. GFR  (NO RACE VARIABLE): 48 ML/MIN/1.73 M^2
GLUCOSE SERPL-MCNC: 84 MG/DL (ref 70–110)
HCT VFR BLD AUTO: 40.3 % (ref 37–48.5)
HCV AB SERPL QL IA: NEGATIVE
HEP C VIRUS HOLD SPECIMEN: NORMAL
HGB BLD-MCNC: 13.5 G/DL (ref 12–16)
HIV 1+2 AB+HIV1 P24 AG SERPL QL IA: NEGATIVE
IMM GRANULOCYTES # BLD AUTO: 0.02 K/UL (ref 0–0.04)
IMM GRANULOCYTES NFR BLD AUTO: 0.3 % (ref 0–0.5)
LACTATE SERPL-SCNC: 1 MMOL/L (ref 0.5–2.2)
LYMPHOCYTES # BLD AUTO: 2 K/UL (ref 1–4.8)
LYMPHOCYTES NFR BLD: 27 % (ref 18–48)
MCH RBC QN AUTO: 27.4 PG (ref 27–31)
MCHC RBC AUTO-ENTMCNC: 33.5 G/DL (ref 32–36)
MCV RBC AUTO: 82 FL (ref 82–98)
MONOCYTES # BLD AUTO: 0.5 K/UL (ref 0.3–1)
MONOCYTES NFR BLD: 6.8 % (ref 4–15)
NEUTROPHILS # BLD AUTO: 4.5 K/UL (ref 1.8–7.7)
NEUTROPHILS NFR BLD: 62.9 % (ref 38–73)
NRBC BLD-RTO: 0 /100 WBC
PLATELET # BLD AUTO: 314 K/UL (ref 150–450)
PMV BLD AUTO: 10.2 FL (ref 9.2–12.9)
POTASSIUM SERPL-SCNC: 3.7 MMOL/L (ref 3.5–5.1)
PROCALCITONIN SERPL IA-MCNC: 0.02 NG/ML
PROT SERPL-MCNC: 9 G/DL (ref 6–8.4)
RBC # BLD AUTO: 4.93 M/UL (ref 4–5.4)
SODIUM SERPL-SCNC: 138 MMOL/L (ref 136–145)
WBC # BLD AUTO: 7.21 K/UL (ref 3.9–12.7)

## 2024-12-09 PROCEDURE — 3008F BODY MASS INDEX DOCD: CPT | Mod: CPTII,S$GLB,, | Performed by: PHYSICIAN ASSISTANT

## 2024-12-09 PROCEDURE — 99285 EMERGENCY DEPT VISIT HI MDM: CPT | Mod: 25

## 2024-12-09 PROCEDURE — 99999 PR PBB SHADOW E&M-EST. PATIENT-LVL V: CPT | Mod: PBBFAC,,, | Performed by: PHYSICIAN ASSISTANT

## 2024-12-09 PROCEDURE — 84145 PROCALCITONIN (PCT): CPT | Performed by: EMERGENCY MEDICINE

## 2024-12-09 PROCEDURE — 96375 TX/PRO/DX INJ NEW DRUG ADDON: CPT

## 2024-12-09 PROCEDURE — 3079F DIAST BP 80-89 MM HG: CPT | Mod: CPTII,S$GLB,, | Performed by: PHYSICIAN ASSISTANT

## 2024-12-09 PROCEDURE — 96365 THER/PROPH/DIAG IV INF INIT: CPT

## 2024-12-09 PROCEDURE — 63600175 PHARM REV CODE 636 W HCPCS: Performed by: HOSPITALIST

## 2024-12-09 PROCEDURE — G0378 HOSPITAL OBSERVATION PER HR: HCPCS

## 2024-12-09 PROCEDURE — 3075F SYST BP GE 130 - 139MM HG: CPT | Mod: CPTII,S$GLB,, | Performed by: PHYSICIAN ASSISTANT

## 2024-12-09 PROCEDURE — 99215 OFFICE O/P EST HI 40 MIN: CPT | Mod: S$GLB,,, | Performed by: PHYSICIAN ASSISTANT

## 2024-12-09 PROCEDURE — 1160F RVW MEDS BY RX/DR IN RCRD: CPT | Mod: CPTII,S$GLB,, | Performed by: PHYSICIAN ASSISTANT

## 2024-12-09 PROCEDURE — 85025 COMPLETE CBC W/AUTO DIFF WBC: CPT | Performed by: PHYSICIAN ASSISTANT

## 2024-12-09 PROCEDURE — 25500020 PHARM REV CODE 255: Performed by: EMERGENCY MEDICINE

## 2024-12-09 PROCEDURE — 63600175 PHARM REV CODE 636 W HCPCS: Performed by: EMERGENCY MEDICINE

## 2024-12-09 PROCEDURE — 96372 THER/PROPH/DIAG INJ SC/IM: CPT | Performed by: HOSPITALIST

## 2024-12-09 PROCEDURE — 25000003 PHARM REV CODE 250: Performed by: HOSPITALIST

## 2024-12-09 PROCEDURE — 4010F ACE/ARB THERAPY RXD/TAKEN: CPT | Mod: CPTII,S$GLB,, | Performed by: PHYSICIAN ASSISTANT

## 2024-12-09 PROCEDURE — 96366 THER/PROPH/DIAG IV INF ADDON: CPT

## 2024-12-09 PROCEDURE — 87389 HIV-1 AG W/HIV-1&-2 AB AG IA: CPT | Performed by: EMERGENCY MEDICINE

## 2024-12-09 PROCEDURE — 80053 COMPREHEN METABOLIC PANEL: CPT | Performed by: PHYSICIAN ASSISTANT

## 2024-12-09 PROCEDURE — 83605 ASSAY OF LACTIC ACID: CPT | Performed by: PHYSICIAN ASSISTANT

## 2024-12-09 PROCEDURE — 86803 HEPATITIS C AB TEST: CPT | Performed by: EMERGENCY MEDICINE

## 2024-12-09 PROCEDURE — 87040 BLOOD CULTURE FOR BACTERIA: CPT | Performed by: EMERGENCY MEDICINE

## 2024-12-09 PROCEDURE — 1159F MED LIST DOCD IN RCRD: CPT | Mod: CPTII,S$GLB,, | Performed by: PHYSICIAN ASSISTANT

## 2024-12-09 RX ORDER — AMOXICILLIN 250 MG
1 CAPSULE ORAL 2 TIMES DAILY PRN
Status: DISCONTINUED | OUTPATIENT
Start: 2024-12-09 | End: 2024-12-11 | Stop reason: HOSPADM

## 2024-12-09 RX ORDER — MORPHINE SULFATE 4 MG/ML
2 INJECTION, SOLUTION INTRAMUSCULAR; INTRAVENOUS EVERY 4 HOURS PRN
Status: DISCONTINUED | OUTPATIENT
Start: 2024-12-09 | End: 2024-12-11 | Stop reason: HOSPADM

## 2024-12-09 RX ORDER — ACETAMINOPHEN 325 MG/1
650 TABLET ORAL EVERY 8 HOURS PRN
Status: DISCONTINUED | OUTPATIENT
Start: 2024-12-09 | End: 2024-12-11 | Stop reason: HOSPADM

## 2024-12-09 RX ORDER — IPRATROPIUM BROMIDE AND ALBUTEROL SULFATE 2.5; .5 MG/3ML; MG/3ML
3 SOLUTION RESPIRATORY (INHALATION) EVERY 6 HOURS PRN
Status: DISCONTINUED | OUTPATIENT
Start: 2024-12-09 | End: 2024-12-11 | Stop reason: HOSPADM

## 2024-12-09 RX ORDER — PROMETHAZINE HYDROCHLORIDE 25 MG/1
25 TABLET ORAL EVERY 6 HOURS PRN
Status: DISCONTINUED | OUTPATIENT
Start: 2024-12-09 | End: 2024-12-11 | Stop reason: HOSPADM

## 2024-12-09 RX ORDER — SULFAMETHOXAZOLE AND TRIMETHOPRIM 800; 160 MG/1; MG/1
1 TABLET ORAL 2 TIMES DAILY
Status: ON HOLD | COMMUNITY
Start: 2024-12-05 | End: 2024-12-11 | Stop reason: HOSPADM

## 2024-12-09 RX ORDER — ALUMINUM HYDROXIDE, MAGNESIUM HYDROXIDE, AND SIMETHICONE 1200; 120; 1200 MG/30ML; MG/30ML; MG/30ML
30 SUSPENSION ORAL 4 TIMES DAILY PRN
Status: DISCONTINUED | OUTPATIENT
Start: 2024-12-09 | End: 2024-12-11 | Stop reason: HOSPADM

## 2024-12-09 RX ORDER — HYDROCODONE BITARTRATE AND ACETAMINOPHEN 5; 325 MG/1; MG/1
1 TABLET ORAL EVERY 6 HOURS PRN
Status: DISCONTINUED | OUTPATIENT
Start: 2024-12-09 | End: 2024-12-11 | Stop reason: HOSPADM

## 2024-12-09 RX ORDER — IBUPROFEN 200 MG
24 TABLET ORAL
Status: DISCONTINUED | OUTPATIENT
Start: 2024-12-09 | End: 2024-12-11 | Stop reason: HOSPADM

## 2024-12-09 RX ORDER — ENOXAPARIN SODIUM 100 MG/ML
40 INJECTION SUBCUTANEOUS EVERY 24 HOURS
Status: DISCONTINUED | OUTPATIENT
Start: 2024-12-09 | End: 2024-12-11 | Stop reason: HOSPADM

## 2024-12-09 RX ORDER — ACETAMINOPHEN 650 MG/1
650 SUPPOSITORY RECTAL EVERY 6 HOURS PRN
Status: DISCONTINUED | OUTPATIENT
Start: 2024-12-09 | End: 2024-12-11 | Stop reason: HOSPADM

## 2024-12-09 RX ORDER — TALC
6 POWDER (GRAM) TOPICAL NIGHTLY PRN
Status: DISCONTINUED | OUTPATIENT
Start: 2024-12-09 | End: 2024-12-11 | Stop reason: HOSPADM

## 2024-12-09 RX ORDER — ONDANSETRON HYDROCHLORIDE 2 MG/ML
4 INJECTION, SOLUTION INTRAVENOUS EVERY 8 HOURS PRN
Status: DISCONTINUED | OUTPATIENT
Start: 2024-12-09 | End: 2024-12-11 | Stop reason: HOSPADM

## 2024-12-09 RX ORDER — SODIUM CHLORIDE 0.9 % (FLUSH) 0.9 %
10 SYRINGE (ML) INJECTION EVERY 12 HOURS PRN
Status: DISCONTINUED | OUTPATIENT
Start: 2024-12-09 | End: 2024-12-11 | Stop reason: HOSPADM

## 2024-12-09 RX ORDER — DIPHENHYDRAMINE HCL 25 MG
25 CAPSULE ORAL EVERY 6 HOURS PRN
Status: DISCONTINUED | OUTPATIENT
Start: 2024-12-10 | End: 2024-12-11 | Stop reason: HOSPADM

## 2024-12-09 RX ORDER — PERPHENAZINE 2 MG
TABLET ORAL
COMMUNITY

## 2024-12-09 RX ORDER — DIPHENHYDRAMINE HYDROCHLORIDE 50 MG/ML
25 INJECTION INTRAMUSCULAR; INTRAVENOUS
Status: COMPLETED | OUTPATIENT
Start: 2024-12-09 | End: 2024-12-09

## 2024-12-09 RX ORDER — IBUPROFEN 200 MG
16 TABLET ORAL
Status: DISCONTINUED | OUTPATIENT
Start: 2024-12-09 | End: 2024-12-11 | Stop reason: HOSPADM

## 2024-12-09 RX ORDER — NALOXONE HCL 0.4 MG/ML
0.02 VIAL (ML) INJECTION
Status: DISCONTINUED | OUTPATIENT
Start: 2024-12-09 | End: 2024-12-11 | Stop reason: HOSPADM

## 2024-12-09 RX ORDER — GLUCAGON 1 MG
1 KIT INJECTION
Status: DISCONTINUED | OUTPATIENT
Start: 2024-12-09 | End: 2024-12-11 | Stop reason: HOSPADM

## 2024-12-09 RX ORDER — VANCOMYCIN 1.75 GRAM/500 ML IN 0.9 % SODIUM CHLORIDE INTRAVENOUS
20 ONCE
Status: COMPLETED | OUTPATIENT
Start: 2024-12-09 | End: 2024-12-09

## 2024-12-09 RX ADMIN — VANCOMYCIN HYDROCHLORIDE 1750 MG: 500 INJECTION, POWDER, LYOPHILIZED, FOR SOLUTION INTRAVENOUS at 09:12

## 2024-12-09 RX ADMIN — DIPHENHYDRAMINE HYDROCHLORIDE 25 MG: 50 INJECTION, SOLUTION INTRAMUSCULAR; INTRAVENOUS at 06:12

## 2024-12-09 RX ADMIN — IOHEXOL 100 ML: 350 INJECTION, SOLUTION INTRAVENOUS at 07:12

## 2024-12-09 RX ADMIN — ENOXAPARIN SODIUM 40 MG: 40 INJECTION SUBCUTANEOUS at 09:12

## 2024-12-09 NOTE — FIRST PROVIDER EVALUATION
" Emergency Department TeleTriage Encounter Note      CHIEF COMPLAINT    Chief Complaint   Patient presents with    Allergic Reaction     Pt developed swelling and rash to face after taking a medication from Dr. Caballero for a skin infection. Pt was given multiple meds and developed rash. Unsure which med caused reaction. Cephalexin, Bactroban, Bactrim, amoxicillin 875 mg. Denies difficulty breathing.        VITAL SIGNS   Initial Vitals [12/09/24 1455]   BP Pulse Resp Temp SpO2   (!) 157/86 90 18 99 °F (37.2 °C) 96 %      MAP       --            ALLERGIES    Review of patient's allergies indicates:   Allergen Reactions    Adhesive      Other reaction(s): Rash, leaves marked impression on area of contact       PROVIDER TRIAGE NOTE  Patient presents with complaint of worsening swelling to the face. Recently started on antibiotics for facial cellulitis. Reports no improvement. Saw PCP and sent to ED. Reports Tmax of 99F at home.    Phy:   Constitutional: well nourished, well developed, appearing stated age, NAD        Initial orders will be placed and care will be transferred to an alternate provider when patient is roomed for a full evaluation. Any additional orders and the final disposition will be determined by that provider.        ORDERS  Labs Reviewed   HEPATITIS C ANTIBODY   HEP C VIRUS HOLD SPECIMEN   HIV 1 / 2 ANTIBODY       ED Orders (720h ago, onward)      Start Ordered     Status Ordering Provider    12/09/24 1459 12/09/24 1459  Hepatitis C Antibody  STAT        Placed in "And" Linked Group    Ordered PARAM BISWAS P.    12/09/24 1459 12/09/24 1459  HCV Virus Hold Specimen  STAT        Placed in "And" Linked Group    Ordered PARAM BISWAS P.    12/09/24 1459 12/09/24 1459  HIV 1/2 Ag/Ab (4th Gen)  STAT         Ordered PARAM BISWAS              Virtual Visit Note: The provider triage portion of this emergency department evaluation and documentation was performed via [email or web address], a HIPAA-compliant " telemedicine application, in concert with a tele-presenter in the room. A face to face patient evaluation with one of my colleagues will occur once the patient is placed in an emergency department room.      DISCLAIMER: This note was prepared with [employer] voice recognition transcription software. Garbled syntax, mangled pronouns, and other bizarre constructions may be attributed to that software system.

## 2024-12-09 NOTE — PROGRESS NOTES
Subjective:      Patient ID: Nallely Mcclure is a 54 y.o. female.    Chief Complaint: Follow-up (She is here for a follow up visit. States she has been having a reaction to soap and neosporin since around mid-day Saturday. Would like to discuss options. )    Patient is known to me, being seen today for follow up.     Last visit 4 days ago with myself, swelling, warmth and pain of R jaw line  dx: cellulitis face  U/s shows lymphadenopathy, no mass or fluid collection   Rx: Bactrim and amoxil  Econsult sent to Dermatology but I have not yet heard back     Symptoms were initially improving but worsened again 2-3days ago when she used Dove soap and neosporin   Now swelling has worsened and her entire face is itching   She feels her face weeps fluid at times     She has not taken anything for itching     Prior failed treatment includes Doxy and metrogel (1 day from Derm 11/21), Kelflex (3 days from Urgent Care)      Review of Systems   Constitutional:  Negative for chills, diaphoresis and fever.   HENT:  Negative for congestion, rhinorrhea and sore throat.    Respiratory:  Negative for cough, shortness of breath and wheezing.    Gastrointestinal:  Negative for abdominal pain, constipation, diarrhea, nausea and vomiting.   Skin:  Positive for color change, rash and wound.   Neurological:  Negative for dizziness, light-headedness and headaches.       Objective:   /86 (BP Location: Left arm, Patient Position: Sitting)   Pulse 103   Temp 98.3 °F (36.8 °C) (Tympanic)   Resp 20   Wt 82.9 kg (182 lb 12.2 oz)   SpO2 99%   BMI 29.50 kg/m²   Physical Exam          Assessment:      1. Cellulitis of face    2. Itching    3. Allergic reaction, initial encounter       Plan:   Cellulitis of face    Itching    Allergic reaction, initial encounter      Patient has failure multiple antibiotics at this point   Discussed with Dr. Triplett in office, we recommended patient be evaluated in ER   She is agreeable and will go next  door

## 2024-12-10 PROBLEM — E78.5 HLD (HYPERLIPIDEMIA): Chronic | Status: ACTIVE | Noted: 2024-12-10

## 2024-12-10 PROBLEM — E78.5 HLD (HYPERLIPIDEMIA): Status: ACTIVE | Noted: 2024-12-10

## 2024-12-10 LAB
ALBUMIN SERPL BCP-MCNC: 4.1 G/DL (ref 3.5–5.2)
ALP SERPL-CCNC: 90 U/L (ref 40–150)
ALT SERPL W/O P-5'-P-CCNC: 23 U/L (ref 10–44)
ANION GAP SERPL CALC-SCNC: 12 MMOL/L (ref 8–16)
AST SERPL-CCNC: 27 U/L (ref 10–40)
BASOPHILS # BLD AUTO: 0.05 K/UL (ref 0–0.2)
BASOPHILS NFR BLD: 0.7 % (ref 0–1.9)
BILIRUB SERPL-MCNC: 1.3 MG/DL (ref 0.1–1)
BUN SERPL-MCNC: 9 MG/DL (ref 6–20)
CALCIUM SERPL-MCNC: 9.8 MG/DL (ref 8.7–10.5)
CHLORIDE SERPL-SCNC: 104 MMOL/L (ref 95–110)
CO2 SERPL-SCNC: 24 MMOL/L (ref 23–29)
CREAT SERPL-MCNC: 1.3 MG/DL (ref 0.5–1.4)
DIFFERENTIAL METHOD BLD: NORMAL
EOSINOPHIL # BLD AUTO: 0.4 K/UL (ref 0–0.5)
EOSINOPHIL NFR BLD: 5.4 % (ref 0–8)
ERYTHROCYTE [DISTWIDTH] IN BLOOD BY AUTOMATED COUNT: 12.7 % (ref 11.5–14.5)
EST. GFR  (NO RACE VARIABLE): 48 ML/MIN/1.73 M^2
GLUCOSE SERPL-MCNC: 81 MG/DL (ref 70–110)
HCT VFR BLD AUTO: 39.8 % (ref 37–48.5)
HGB BLD-MCNC: 12.9 G/DL (ref 12–16)
IMM GRANULOCYTES # BLD AUTO: 0.02 K/UL (ref 0–0.04)
IMM GRANULOCYTES NFR BLD AUTO: 0.3 % (ref 0–0.5)
LYMPHOCYTES # BLD AUTO: 2.4 K/UL (ref 1–4.8)
LYMPHOCYTES NFR BLD: 35.3 % (ref 18–48)
MCH RBC QN AUTO: 27.4 PG (ref 27–31)
MCHC RBC AUTO-ENTMCNC: 32.4 G/DL (ref 32–36)
MCV RBC AUTO: 85 FL (ref 82–98)
MONOCYTES # BLD AUTO: 0.6 K/UL (ref 0.3–1)
MONOCYTES NFR BLD: 9 % (ref 4–15)
NEUTROPHILS # BLD AUTO: 3.3 K/UL (ref 1.8–7.7)
NEUTROPHILS NFR BLD: 49.3 % (ref 38–73)
NRBC BLD-RTO: 0 /100 WBC
PLATELET # BLD AUTO: 316 K/UL (ref 150–450)
PMV BLD AUTO: 10.4 FL (ref 9.2–12.9)
POTASSIUM SERPL-SCNC: 3.7 MMOL/L (ref 3.5–5.1)
PROT SERPL-MCNC: 8.1 G/DL (ref 6–8.4)
RBC # BLD AUTO: 4.71 M/UL (ref 4–5.4)
SODIUM SERPL-SCNC: 140 MMOL/L (ref 136–145)
VANCOMYCIN SERPL-MCNC: 13.2 UG/ML
WBC # BLD AUTO: 6.79 K/UL (ref 3.9–12.7)

## 2024-12-10 PROCEDURE — 85025 COMPLETE CBC W/AUTO DIFF WBC: CPT | Performed by: HOSPITALIST

## 2024-12-10 PROCEDURE — 36415 COLL VENOUS BLD VENIPUNCTURE: CPT | Performed by: HOSPITALIST

## 2024-12-10 PROCEDURE — 96372 THER/PROPH/DIAG INJ SC/IM: CPT | Performed by: HOSPITALIST

## 2024-12-10 PROCEDURE — 63600175 PHARM REV CODE 636 W HCPCS: Performed by: HOSPITALIST

## 2024-12-10 PROCEDURE — 96366 THER/PROPH/DIAG IV INF ADDON: CPT

## 2024-12-10 PROCEDURE — 80202 ASSAY OF VANCOMYCIN: CPT | Performed by: HOSPITALIST

## 2024-12-10 PROCEDURE — 80053 COMPREHEN METABOLIC PANEL: CPT | Performed by: HOSPITALIST

## 2024-12-10 PROCEDURE — 25000003 PHARM REV CODE 250: Performed by: HOSPITALIST

## 2024-12-10 PROCEDURE — 25000003 PHARM REV CODE 250: Performed by: STUDENT IN AN ORGANIZED HEALTH CARE EDUCATION/TRAINING PROGRAM

## 2024-12-10 PROCEDURE — 63600175 PHARM REV CODE 636 W HCPCS: Performed by: STUDENT IN AN ORGANIZED HEALTH CARE EDUCATION/TRAINING PROGRAM

## 2024-12-10 PROCEDURE — G0378 HOSPITAL OBSERVATION PER HR: HCPCS

## 2024-12-10 RX ORDER — CEFDINIR 300 MG/1
300 CAPSULE ORAL EVERY 12 HOURS
Status: DISCONTINUED | OUTPATIENT
Start: 2024-12-10 | End: 2024-12-11 | Stop reason: HOSPADM

## 2024-12-10 RX ORDER — ATORVASTATIN CALCIUM 10 MG/1
10 TABLET, FILM COATED ORAL NIGHTLY
Status: DISCONTINUED | OUTPATIENT
Start: 2024-12-10 | End: 2024-12-11 | Stop reason: HOSPADM

## 2024-12-10 RX ORDER — ATORVASTATIN CALCIUM 10 MG/1
10 TABLET, FILM COATED ORAL DAILY
Status: DISCONTINUED | OUTPATIENT
Start: 2024-12-10 | End: 2024-12-10

## 2024-12-10 RX ORDER — HYDRALAZINE HYDROCHLORIDE 20 MG/ML
10 INJECTION INTRAMUSCULAR; INTRAVENOUS EVERY 6 HOURS PRN
Status: DISCONTINUED | OUTPATIENT
Start: 2024-12-10 | End: 2024-12-11 | Stop reason: HOSPADM

## 2024-12-10 RX ADMIN — ENOXAPARIN SODIUM 40 MG: 40 INJECTION SUBCUTANEOUS at 05:12

## 2024-12-10 RX ADMIN — DIPHENHYDRAMINE HYDROCHLORIDE 25 MG: 25 CAPSULE ORAL at 12:12

## 2024-12-10 RX ADMIN — CEFDINIR 300 MG: 300 CAPSULE ORAL at 09:12

## 2024-12-10 RX ADMIN — CEFDINIR 300 MG: 300 CAPSULE ORAL at 08:12

## 2024-12-10 RX ADMIN — VANCOMYCIN HYDROCHLORIDE 1250 MG: 1.25 INJECTION, POWDER, LYOPHILIZED, FOR SOLUTION INTRAVENOUS at 12:12

## 2024-12-10 RX ADMIN — DIPHENHYDRAMINE HYDROCHLORIDE 25 MG: 25 CAPSULE ORAL at 07:12

## 2024-12-10 RX ADMIN — Medication 4 TABLET: at 05:12

## 2024-12-10 RX ADMIN — THERA TABS 1 TABLET: TAB at 09:12

## 2024-12-10 RX ADMIN — ATORVASTATIN CALCIUM 10 MG: 10 TABLET, FILM COATED ORAL at 08:12

## 2024-12-10 RX ADMIN — Medication 4 TABLET: at 12:12

## 2024-12-10 RX ADMIN — HYDROCODONE BITARTRATE AND ACETAMINOPHEN 1 TABLET: 5; 325 TABLET ORAL at 05:12

## 2024-12-10 RX ADMIN — HYDROCODONE BITARTRATE AND ACETAMINOPHEN 1 TABLET: 5; 325 TABLET ORAL at 02:12

## 2024-12-10 NOTE — PROGRESS NOTES
[facility] Medicine  Progress Note    Patient Name: Nallely Mcclure  MRN: [id number]  Patient Class: OP- Observation   Admission Date: 12/9/2024  Length of Stay: 0 days  Attending Physician: Amanda Hill,*  Primary Care Provider: Fransisca Chen DO        Subjective     Principal Problem:Facial cellulitis        HPI:  Nallely Mcclure is a 54 y.o. female with a PMH  has a past medical history of Anemia, unspecified, Fatty liver disease, nonalcoholic, Fibroids, and Hypertension. who presented to the ED for further evaluation of potential drug reaction and/or facial cellulitis.  Patient reported being seen in the ED, urgent care, as well as by her PCP and dermatologist regarding right facial swelling and numerous pimple like bumps since November.  She reported being prescribed Bactroban and metronidazole which was switch to cephalexin and Bactroban, then switched again to amoxicillin and Bactrim.  After initiation of her most recent antibiotics, she reported endorsing diffuse itching with her urticaria and worsening facial swelling in itching as well prompting her to come to the ED to be further evaluated.  Patient reported also being seen by rheumatologist outpatient following positive GODWIN test with the extended panel pain negative inpatient being told she does not need to worry about her GODWIN being positive as she has negative for autoimmune processes.  Since onset of symptoms, patient denied endorsing any lightheadedness, dizziness, headache, visual changes, fever, chills, sweats, throat swelling, dysphagia, nausea, vomiting, chest pain, shortness a breath, abdominal pain, dysuria, hematuria, melena, hematochezia, diarrhea, or onset neurological deficits.  Patient does report the right side of face feeling tight causing pressure which is now extending to the left side of her face with pain exacerbated with opening her mouth however, reports all other review of systems negative except  as noted above.  Initial workup in the ED revealed patient to be afebrile without leukocytosis, CBC and CMP fairly unremarkable with the exception of creatinine/GFR measuring 1.3/48.  Lactic acid and procalcitonin within normal limits.  CT maxillofacial positive for right-sided facial cellulitis with no sizable organized abscess fluid collection identified however did show shotty lymph nodes present in addition to right TMA degenerative changes noted and mild sinus opacities present.  Patient initiated on vancomycin and admitted to Hospital Medicine under observation for continued medical management.    PCP: Fransisca Chen       Overview/Hospital Course:  Admitted under Hospital Medicine for right-sided facial cellulitis   Currently on IV antibiotics   Follow up on blood cultures    Interval History:     No acute events, patient resting comfortably   Alert and oriented x3   Hemodynamically stable   Afebrile, no leukocytosis   Currently on IV antibiotics, still with facial swelling, discomfort, will monitor overnight, based on symptom improvement, culture results, anticipate discharge in next 24-48 hours   Blood culture x2 negative to date       Review of Systems  Objective:     Vital Signs (Most Recent):  Temp: 98.4 °F (36.9 °C) (12/10/24 1220)  Pulse: 77 (12/10/24 1220)  Resp: 18 (12/10/24 1220)  BP: 119/71 (12/10/24 1220)  SpO2: 98 % (12/10/24 1220) Vital Signs (24h Range):  Temp:  [97.7 °F (36.5 °C)-99 °F (37.2 °C)] 98.4 °F (36.9 °C)  Pulse:  [57-92] 77  Resp:  [16-20] 18  SpO2:  [95 %-100 %] 98 %  BP: (113-157)/() 119/71     Weight: 82.8 kg (182 lb 8.7 oz)  Body mass index is 29.46 kg/m².    Intake/Output Summary (Last 24 hours) at 12/10/2024 1428  Last data filed at 12/10/2024 1357  Gross per 24 hour   Intake 490 ml   Output 0 ml   Net 490 ml         Physical Exam      Constitutional:       General: She is not in acute distress.     Appearance: Normal appearance. She is normal weight. She is not  ill-appearing, toxic-appearing or diaphoretic.   HENT:      Head: Normocephalic and atraumatic.      Comments: Diffuse right-sided facial swelling noted with mild TTP throughout without evidence of fluctuance, open wounds/drainage noted.  Numerous papuels noted through face. (see media for visual depiction)     Right Ear: External ear normal.      Left Ear: External ear normal.      Nose: Nose normal. No congestion or rhinorrhea.      Mouth/Throat:      Mouth: Mucous membranes are moist.      Pharynx: Oropharynx is clear. No oropharyngeal exudate or posterior oropharyngeal erythema.   Eyes:      General: No scleral icterus.     Extraocular Movements: Extraocular movements intact.      Conjunctiva/sclera: Conjunctivae normal.      Pupils: Pupils are equal, round, and reactive to light.   Neck:      Vascular: No carotid bruit.   Cardiovascular:      Rate and Rhythm: Normal rate and regular rhythm.      Pulses: Normal pulses.      Heart sounds: Normal heart sounds. No murmur heard.     No friction rub. No gallop.   Pulmonary:      Effort: Pulmonary effort is normal. No respiratory distress.      Breath sounds: Normal breath sounds. No stridor. No wheezing, rhonchi or rales.   Chest:      Chest wall: No tenderness.   Abdominal:      General: Abdomen is flat. Bowel sounds are normal. There is no distension.      Palpations: Abdomen is soft. There is no mass.      Tenderness: There is no abdominal tenderness. There is no right CVA tenderness, left CVA tenderness, guarding or rebound.      Hernia: No hernia is present.   Musculoskeletal:         General: No swelling, tenderness, deformity or signs of injury. Normal range of motion.      Cervical back: Normal range of motion and neck supple. No rigidity or tenderness.      Right lower leg: No edema.      Left lower leg: No edema.   Lymphadenopathy:      Cervical: No cervical adenopathy.   Skin:     General: Skin is warm and dry.      Capillary Refill: Capillary refill takes  less than 2 seconds.      Coloration: Skin is not jaundiced or pale.      Findings: Rash present. No bruising, erythema or lesion.      Comments: Skin lesions as noted in HEENT suction. (see media for visual depiction)   Neurological:      General: No focal deficit present.      Mental Status: She is alert and oriented to person, place, and time. Mental status is at baseline.      Cranial Nerves: No cranial nerve deficit.      Sensory: No sensory deficit.      Motor: No weakness.      Coordination: Coordination normal.   Psychiatric:         Mood and Affect: Mood normal.         Behavior: Behavior normal.         Thought Content: Thought content normal.         Judgment: Judgment normal.         Significant Labs: All pertinent labs within the past 24 hours have been reviewed.  CBC:   Recent Labs   Lab 12/09/24  1545 12/10/24  0628   WBC 7.21 6.79   HGB 13.5 12.9   HCT 40.3 39.8    316     CMP:   Recent Labs   Lab 12/09/24  1545 12/10/24  0628    140   K 3.7 3.7    104   CO2 22* 24   GLU 84 81   BUN 8 9   CREATININE 1.3 1.3   CALCIUM 10.7* 9.8   PROT 9.0* 8.1   ALBUMIN 4.6 4.1   BILITOT 0.9 1.3*   ALKPHOS 94 90   AST 29 27   ALT 23 23   ANIONGAP 13 12       Significant Imaging:     Imaging Results              CT Maxillofacial With Contrast (Final result)  Result time 12/09/24 19:57:37      Final result by Eloina Cardoza MD (12/09/24 19:57:37)                   Impression:    FINDINGS/  Right facial swelling/cellulitis.  No sizable organized abscess fluid collection identified.  Shotty lymph nodes present.  Right TMJ degenerative change present.  Mild sinus opacity present      Electronically signed by: Eloina Cardoza  Date:    12/09/2024  Time:    19:57               Narrative:    EXAMINATION:  CT MAXILLOFACIAL WITH CONTRAST    CLINICAL HISTORY:  Maxillary/facial abscess;    TECHNIQUE:  Low dose axial images, sagittal and coronal reformations were obtained through the face with  intravenous contrast enhancement.    COMPARISON:  None                                       Assessment and Plan     * Facial cellulitis  Patient presented with worsening facial swelling and rash times 3 weeks duration and was found to have evidence of cellulitis on CT maxillofacial.  Initial concerns for adverse drug reaction given multiple trials of antibiotics with patient receiving Benadryl prior to admission.  Patient seen by Dermatology outpatient and was being treated for rosacea as well as urgent care PCP for treatment of acne/cellulitis.  Initial workup in the ED revealed patient to be afebrile without leukocytosis, CBC and CMP fairly unremarkable with the exception of creatinine/GFR measuring 1.3/48.  Lactic acid and procalcitonin within normal limits.  CT maxillofacial positive for right-sided facial cellulitis with no sizable organized abscess fluid collection identified however did show shotty lymph nodes present in addition to right TMA degenerative changes noted and mild sinus opacities present.  Patient initiated on vancomycin and prn benadryl. Will her observe for response on antibiotics prior to initiating steroids and/or reaching out to Dermatology for further recommendations.  Plan:  -continue antibiotics  -continue benadryl p.r.n.  -monitor labs  -consider steroids and dermatology consult if symptoms fail to improve       HLD (hyperlipidemia)  Patient is chronically on statin.will continue for now. Last Lipid Panel:   Lab Results   Component Value Date    CHOL 115 (L) 09/27/2024    HDL 43 09/27/2024    LDLCALC 66.0 09/27/2024    TRIG 30 09/27/2024    CHOLHDL 37.4 09/27/2024   Plan:  -Continue home medication  -low fat/low calorie diet      Hypertension  Chronic, controlled.  Latest blood pressure and vitals reviewed-   Temp:  [97.7 °F (36.5 °C)-99 °F (37.2 °C)]   Pulse:  []   Resp:  [16-20]   BP: (125-157)/()   SpO2:  [95 %-100 %] .   Home meds for hypertension were reviewed and  noted below.   Hypertension Medications               lisinopriL-hydrochlorothiazide (PRINZIDE,ZESTORETIC) 20-12.5 mg per tablet Take 1 tablet by mouth once daily.     While in the hospital, will manage blood pressure as follows; Continue home antihypertensive regimen    Will utilize p.r.n. blood pressure medication only if patient's blood pressure greater than  180/110 and she develops symptoms such as worsening chest pain or shortness of breath.        VTE Risk Mitigation (From admission, onward)           Ordered     enoxaparin injection 40 mg  Daily         12/09/24 2032     IP VTE HIGH RISK PATIENT  Once         12/09/24 2032     Place sequential compression device  Until discontinued         12/09/24 2032                    Discharge Planning   SUZANNE: 12/11/2024     Code Status: Full Code   Medical Readiness for Discharge Date:   Discharge Plan A: Home                        Amanda Hill MD  Department of Hospital Medicine   [place] - Med Surg

## 2024-12-10 NOTE — H&P
[facility] - Emergency Dept.  [facility] Medicine  History & Physical    Patient Name: Nallely Mcclure  MRN: [id number]  Patient Class: OP- Observation  Admission Date: 12/9/2024  Attending Physician: Jeremiah Giles MD   Primary Care Provider: Fransisca Chen DO         Patient information was obtained from patient, past medical records, and ER records.     Subjective:     Principal Problem:Facial cellulitis    Chief Complaint:   Chief Complaint   Patient presents with    Allergic Reaction     Pt developed swelling and rash to face after taking a medication from Dr. Caballero for a skin infection. Pt was given multiple meds and developed rash. Unsure which med caused reaction. Cephalexin, Bactroban, Bactrim, amoxicillin 875 mg. Denies difficulty breathing.         HPI: Nallely Mcclure is a 54 y.o. female with a PMH  has a past medical history of Anemia, unspecified, Fatty liver disease, nonalcoholic, Fibroids, and Hypertension. who presented to the ED for further evaluation of potential drug reaction and/or facial cellulitis.  Patient reported being seen in the ED, urgent care, as well as by her PCP and dermatologist regarding right facial swelling and numerous pimple like bumps since November.  She reported being prescribed Bactroban and metronidazole which was switch to cephalexin and Bactroban, then switched again to amoxicillin and Bactrim.  After initiation of her most recent antibiotics, she reported endorsing diffuse itching with her urticaria and worsening facial swelling in itching as well prompting her to come to the ED to be further evaluated.  Patient reported also being seen by rheumatologist outpatient following positive GODWIN test with the extended panel pain negative inpatient being told she does not need to worry about her GODWIN being positive as she has negative for autoimmune processes.  Since onset of symptoms, patient denied endorsing any lightheadedness, dizziness, headache, visual changes, fever,  chills, sweats, throat swelling, dysphagia, nausea, vomiting, chest pain, shortness a breath, abdominal pain, dysuria, hematuria, melena, hematochezia, diarrhea, or onset neurological deficits.  Patient does report the right side of face feeling tight causing pressure which is now extending to the left side of her face with pain exacerbated with opening her mouth however, reports all other review of systems negative except as noted above.  Initial workup in the ED revealed patient to be afebrile without leukocytosis, CBC and CMP fairly unremarkable with the exception of creatinine/GFR measuring 1.3/48.  Lactic acid and procalcitonin within normal limits.  CT maxillofacial positive for right-sided facial cellulitis with no sizable organized abscess fluid collection identified however did show shotty lymph nodes present in addition to right TMA degenerative changes noted and mild sinus opacities present.  Patient initiated on vancomycin and admitted to Hospital Medicine under observation for continued medical management.    PCP: Fransisca Chen       Past Medical History:   Diagnosis Date    Anemia, unspecified     Fatty liver disease, nonalcoholic     Fibroids     Hypertension        Past Surgical History:   Procedure Laterality Date    BREAST BIOPSY  11/2005    COLONOSCOPY  09/2021    no polyps. recall 10 years    HYSTERECTOMY  2019    OOPHORECTOMY      ABE with LS&O  06/20/2019       Review of patient's allergies indicates:   Allergen Reactions    Adhesive      Other reaction(s): Rash, leaves marked impression on area of contact       No current facility-administered medications on file prior to encounter.     Current Outpatient Medications on File Prior to Encounter   Medication Sig    amoxicillin (AMOXIL) 875 MG tablet Take 1 tablet (875 mg total) by mouth every 12 (twelve) hours.    atorvastatin (LIPITOR) 10 MG tablet Take 1 tablet (10 mg total) by mouth once daily.    cholecalciferol, vitamin D3, (VITAMIN D3) 50  mcg (2,000 unit) Cap capsule Take 1 capsule by mouth once daily.    doxycycline (VIBRA-TABS) 100 MG tablet Take 1 tablet (100 mg total) by mouth once daily. (Patient not taking: Reported on 12/9/2024)    doxycycline (VIBRA-TABS) 100 MG tablet Take 1 tablet (100 mg total) by mouth every 12 (twelve) hours. for 7 days (Patient not taking: Reported on 12/9/2024)    ergocalciferol, vitamin D2, (VITAMIN D ORAL) Vitamin D    estradioL (ESTRACE) 0.01 % (0.1 mg/gram) vaginal cream Insert 1 gram vaginally hs x 14, then twice weekly (Patient not taking: Reported on 12/9/2024)    fish,bora,flax oils-om3,6,9no1 (OMEGA 3-6-9) 1,200 mg Cap Omega 3    ketorolac (TORADOL) 10 mg tablet Take 10 mg by mouth every 8 (eight) hours as needed for Pain. (Patient not taking: Reported on 12/9/2024)    LINZESS 145 mcg Cap capsule Take 145 mcg by mouth nightly.    lisinopriL-hydrochlorothiazide (PRINZIDE,ZESTORETIC) 20-12.5 mg per tablet Take 1 tablet by mouth once daily.    metronidazole 0.75% (METROCREAM) 0.75 % Crea Apply topically 2 (two) times daily. (Patient not taking: Reported on 12/9/2024)    multivit with minerals/lutein (MULTIVITAMIN 50 PLUS ORAL) Multivitamin    multivitamin (THERAGRAN) per tablet Take 1 tablet by mouth once daily.    mupirocin (BACTROBAN) 2 % ointment Apply topically as needed. (Patient not taking: Reported on 12/9/2024)    omega-3 fatty acids/fish oil (FISH OIL-OMEGA-3 FATTY ACIDS) 300-1,000 mg capsule Take by mouth once daily.    sulfamethoxazole-trimethoprim 800-160mg (BACTRIM DS) 800-160 mg Tab Take 1 tablet by mouth 2 (two) times daily. (Patient not taking: Reported on 12/9/2024)    tacrolimus (PROTOPIC) 0.1 % ointment AAA bid prn rash of face/body.  Non-steroid. Safe to use long-term (Patient not taking: Reported on 12/9/2024)    triamcinolone acetonide 0.025% (KENALOG) 0.025 % Oint AAA bid prn rashes on face. Mild steroid. (Patient not taking: Reported on 12/9/2024)     Family History       Problem Relation  (Age of Onset)    Breast cancer Paternal Aunt, Other    Colon cancer Other    Diabetes Other    Hypertension Mother, Sister    Ovarian cancer Other    Pancreatic cancer Father          Tobacco Use    Smoking status: Never    Smokeless tobacco: Never   Substance and Sexual Activity    Alcohol use: Yes    Drug use: Never    Sexual activity: Yes     Review of Systems   All other systems reviewed and are negative.    Objective:     Vital Signs (Most Recent):  Temp: 99 °F (37.2 °C) (12/09/24 1455)  Pulse: 82 (12/09/24 2003)  Resp: 18 (12/09/24 1455)  BP: (!) 148/100 (12/09/24 2003)  SpO2: 99 % (12/09/24 2003) Vital Signs (24h Range):  Temp:  [98.3 °F (36.8 °C)-99 °F (37.2 °C)] 99 °F (37.2 °C)  Pulse:  [] 82  Resp:  [18-20] 18  SpO2:  [96 %-100 %] 99 %  BP: (129-157)/() 148/100     Weight: 82.8 kg (182 lb 8.7 oz)  Body mass index is 29.46 kg/m².     Physical Exam  Vitals reviewed.   Constitutional:       General: She is not in acute distress.     Appearance: Normal appearance. She is normal weight. She is not ill-appearing, toxic-appearing or diaphoretic.   HENT:      Head: Normocephalic and atraumatic.      Comments: Diffuse right-sided facial swelling noted with mild TTP throughout without evidence of fluctuance, open wounds/drainage noted.  Numerous papuels noted through face. (see media for visual depiction)     Right Ear: External ear normal.      Left Ear: External ear normal.      Nose: Nose normal. No congestion or rhinorrhea.      Mouth/Throat:      Mouth: Mucous membranes are moist.      Pharynx: Oropharynx is clear. No oropharyngeal exudate or posterior oropharyngeal erythema.   Eyes:      General: No scleral icterus.     Extraocular Movements: Extraocular movements intact.      Conjunctiva/sclera: Conjunctivae normal.      Pupils: Pupils are equal, round, and reactive to light.   Neck:      Vascular: No carotid bruit.   Cardiovascular:      Rate and Rhythm: Normal rate and regular rhythm.       Pulses: Normal pulses.      Heart sounds: Normal heart sounds. No murmur heard.     No friction rub. No gallop.   Pulmonary:      Effort: Pulmonary effort is normal. No respiratory distress.      Breath sounds: Normal breath sounds. No stridor. No wheezing, rhonchi or rales.   Chest:      Chest wall: No tenderness.   Abdominal:      General: Abdomen is flat. Bowel sounds are normal. There is no distension.      Palpations: Abdomen is soft. There is no mass.      Tenderness: There is no abdominal tenderness. There is no right CVA tenderness, left CVA tenderness, guarding or rebound.      Hernia: No hernia is present.   Musculoskeletal:         General: No swelling, tenderness, deformity or signs of injury. Normal range of motion.      Cervical back: Normal range of motion and neck supple. No rigidity or tenderness.      Right lower leg: No edema.      Left lower leg: No edema.   Lymphadenopathy:      Cervical: No cervical adenopathy.   Skin:     General: Skin is warm and dry.      Capillary Refill: Capillary refill takes less than 2 seconds.      Coloration: Skin is not jaundiced or pale.      Findings: Rash present. No bruising, erythema or lesion.      Comments: Skin lesions as noted in HEENT suction. (see media for visual depiction)   Neurological:      General: No focal deficit present.      Mental Status: She is alert and oriented to person, place, and time. Mental status is at baseline.      Cranial Nerves: No cranial nerve deficit.      Sensory: No sensory deficit.      Motor: No weakness.      Coordination: Coordination normal.   Psychiatric:         Mood and Affect: Mood normal.         Behavior: Behavior normal.         Thought Content: Thought content normal.         Judgment: Judgment normal.              CRANIAL NERVES     CN III, IV, VI   Pupils are equal, round, and reactive to light.               Significant Labs: All pertinent labs within the past 24 hours have been reviewed.    Significant Imaging:  I have reviewed all pertinent imaging results/findings within the past 24 hours.    LABS:  Recent Results (from the past 24 hours)   Hepatitis C Antibody    Collection Time: 12/09/24  3:45 PM   Result Value Ref Range    Hepatitis C Ab Negative Negative   HCV Virus Hold Specimen    Collection Time: 12/09/24  3:45 PM   Result Value Ref Range    HEP C Virus Hold Specimen Hold for HCV sendout    HIV 1/2 Ag/Ab (4th Gen)    Collection Time: 12/09/24  3:45 PM   Result Value Ref Range    HIV 1/2 Ag/Ab Negative Negative   CBC W/ AUTO DIFFERENTIAL    Collection Time: 12/09/24  3:45 PM   Result Value Ref Range    WBC 7.21 3.90 - 12.70 K/uL    RBC 4.93 4.00 - 5.40 M/uL    Hemoglobin 13.5 12.0 - 16.0 g/dL    Hematocrit 40.3 37.0 - 48.5 %    MCV 82 82 - 98 fL    MCH 27.4 27.0 - 31.0 pg    MCHC 33.5 32.0 - 36.0 g/dL    RDW 12.9 11.5 - 14.5 %    Platelets 314 150 - 450 K/uL    MPV 10.2 9.2 - 12.9 fL    Immature Granulocytes 0.3 0.0 - 0.5 %    Gran # (ANC) 4.5 1.8 - 7.7 K/uL    Immature Grans (Abs) 0.02 0.00 - 0.04 K/uL    Lymph # 2.0 1.0 - 4.8 K/uL    Mono # 0.5 0.3 - 1.0 K/uL    Eos # 0.2 0.0 - 0.5 K/uL    Baso # 0.04 0.00 - 0.20 K/uL    nRBC 0 0 /100 WBC    Gran % 62.9 38.0 - 73.0 %    Lymph % 27.0 18.0 - 48.0 %    Mono % 6.8 4.0 - 15.0 %    Eosinophil % 2.4 0.0 - 8.0 %    Basophil % 0.6 0.0 - 1.9 %    Differential Method Automated    Comp. Metabolic Panel    Collection Time: 12/09/24  3:45 PM   Result Value Ref Range    Sodium 138 136 - 145 mmol/L    Potassium 3.7 3.5 - 5.1 mmol/L    Chloride 103 95 - 110 mmol/L    CO2 22 (L) 23 - 29 mmol/L    Glucose 84 70 - 110 mg/dL    BUN 8 6 - 20 mg/dL    Creatinine 1.3 0.5 - 1.4 mg/dL    Calcium 10.7 (H) 8.7 - 10.5 mg/dL    Total Protein 9.0 (H) 6.0 - 8.4 g/dL    Albumin 4.6 3.5 - 5.2 g/dL    Total Bilirubin 0.9 0.1 - 1.0 mg/dL    Alkaline Phosphatase 94 40 - 150 U/L    AST 29 10 - 40 U/L    ALT 23 10 - 44 U/L    eGFR 48 (A) >60 mL/min/1.73 m^2    Anion Gap 13 8 - 16 mmol/L   Lactic acid,  plasma    Collection Time: 12/09/24  3:45 PM   Result Value Ref Range    Lactate (Lactic Acid) 1.0 0.5 - 2.2 mmol/L   Procalcitonin    Collection Time: 12/09/24  6:52 PM   Result Value Ref Range    Procalcitonin 0.02 <0.25 ng/mL       RADIOLOGY  CT Maxillofacial With Contrast    Result Date: 12/9/2024  EXAMINATION: CT MAXILLOFACIAL WITH CONTRAST CLINICAL HISTORY: Maxillary/facial abscess; TECHNIQUE: Low dose axial images, sagittal and coronal reformations were obtained through the face with intravenous contrast enhancement. COMPARISON: None     FINDINGS/ Right facial swelling/cellulitis.  No sizable organized abscess fluid collection identified.  Shotty lymph nodes present.  Right TMJ degenerative change present.  Mild sinus opacity present Electronically signed by: Eloina Cardoza Date:    12/09/2024 Time:    19:57    US Soft Tissue Head Neck    Result Date: 12/5/2024  EXAMINATION: US SOFT TISSUE HEAD NECK CLINICAL HISTORY: Disorder of the skin and subcutaneous tissue, unspecified TECHNIQUE: Limited ultrasound of the right face/cheek area COMPARISON: None FINDINGS: No discrete mass or fluid collection.  Shotty lymph nodes are seen within the region of submandibular and submental region measuring up to 10 mm.     No significant abnormalities Electronically signed by: Hayden Guzman MD Date:    12/05/2024 Time:    10:55      EKG    MICROBIOLOGY    [facility]    Assessment/Plan:     * Facial cellulitis  Patient presented with worsening facial swelling and rash times 3 weeks duration and was found to have evidence of cellulitis on CT maxillofacial.  Initial concerns for adverse drug reaction given multiple trials of antibiotics with patient receiving Benadryl prior to admission.  Patient seen by Dermatology outpatient and was being treated for rosacea as well as urgent care PCP for treatment of acne/cellulitis.  Initial workup in the ED revealed patient to be afebrile without leukocytosis, CBC and CMP fairly unremarkable  with the exception of creatinine/GFR measuring 1.3/48.  Lactic acid and procalcitonin within normal limits.  CT maxillofacial positive for right-sided facial cellulitis with no sizable organized abscess fluid collection identified however did show shotty lymph nodes present in addition to right TMA degenerative changes noted and mild sinus opacities present.  Patient initiated on vancomycin and prn benadryl. Will her observe for response on antibiotics prior to initiating steroids and/or reaching out to Dermatology for further recommendations.  Plan:  -continue antibiotics  -continue benadryl p.r.n.  -monitor labs  -consider steroids and dermatology consult if symptoms fail to improve       Hypertension  Chronic, controlled.  Latest blood pressure and vitals reviewed-   Temp:  [97.7 °F (36.5 °C)-99 °F (37.2 °C)]   Pulse:  []   Resp:  [16-20]   BP: (125-157)/()   SpO2:  [95 %-100 %] .   Home meds for hypertension were reviewed and noted below.   Hypertension Medications               lisinopriL-hydrochlorothiazide (PRINZIDE,ZESTORETIC) 20-12.5 mg per tablet Take 1 tablet by mouth once daily.     While in the hospital, will manage blood pressure as follows; Continue home antihypertensive regimen    Will utilize p.r.n. blood pressure medication only if patient's blood pressure greater than  180/110 and she develops symptoms such as worsening chest pain or shortness of breath.      HLD (hyperlipidemia)  Patient is chronically on statin.will continue for now. Last Lipid Panel:   Lab Results   Component Value Date    CHOL 115 (L) 09/27/2024    HDL 43 09/27/2024    LDLCALC 66.0 09/27/2024    TRIG 30 09/27/2024    CHOLHDL 37.4 09/27/2024   Plan:  -Continue home medication  -low fat/low calorie diet        VTE Risk Mitigation (From admission, onward)           Ordered     enoxaparin injection 40 mg  Daily         12/09/24 2032     IP VTE HIGH RISK PATIENT  Once         12/09/24 2032     Place sequential  compression device  Until discontinued         12/09/24 2032                  //Core Measures   -DVT proph: SCDs, Lovenox   -Code status: Full    -Surrogate: none provided       Components of this note were documented using a voice recognition system and are subject to errors not corrected at the time the document was proof read. Please contact the author for any clarifications.       On 12/09/2024, patient should be placed in hospital observation services under my care.      Pharmacokinetic Initial Assessment: IV Vancomycin    Assessment/Plan:    Initiate intravenous vancomycin with loading dose of 1750 mg once with subsequent doses when random concentrations are less than 15 mcg/mL  Desired empiric serum trough concentration is 10 to 15 mcg/mL  Draw vancomycin random level on 12/10 at 0930.  Pharmacy will continue to follow and monitor vancomycin.      Please contact pharmacy at extension [phone] with any questions regarding this assessment.     Thank you for the consult,   Era Villagomez       Patient brief summary:  Nallely Mcclure is a 54 y.o. female initiated on antimicrobial therapy with IV Vancomycin for treatment of suspected skin & soft tissue infection    Drug Allergies:   Review of patient's allergies indicates:   Allergen Reactions    Adhesive      Other reaction(s): Rash, leaves marked impression on area of contact       Actual Body Weight:   82.8 kg    Renal Function:   Estimated Creatinine Clearance: 52.4 mL/min (based on SCr of 1.3 mg/dL).,     Dialysis Method (if applicable):  N/A    CBC (last 72 hours):  Recent Labs   Lab Result Units 12/09/24  1545   WBC K/uL 7.21   Hemoglobin g/dL 13.5   Hematocrit % 40.3   Platelets K/uL 314   Gran % % 62.9   Lymph % % 27.0   Mono % % 6.8   Eosinophil % % 2.4   Basophil % % 0.6   Differential Method  Automated       Metabolic Panel (last 72 hours):  Recent Labs   Lab Result Units 12/09/24  1545   Sodium mmol/L 138   Potassium mmol/L 3.7   Chloride mmol/L 103  "  CO2 mmol/L 22*   Glucose mg/dL 84   BUN mg/dL 8   Creatinine mg/dL 1.3   Albumin g/dL 4.6   Total Bilirubin mg/dL 0.9   Alkaline Phosphatase U/L 94   AST U/L 29   ALT U/L 23       Drug levels (last 3 results):  No results for input(s): "VANCOMYCINRA", "VANCORANDOM", "VANCOMYCINPE", "VANCOPEAK", "VANCOMYCINTR", "VANCOTROUGH" in the last 72 hours.    Microbiologic Results:  Microbiology Results (last 7 days)       Procedure Component Value Units Date/Time    Blood Culture #1 **CANNOT BE ORDERED STAT** [[id number]] Collected: 12/09/24 1851    Order Status: Sent Specimen: Blood from Peripheral, Antecubital, Right     Blood Culture #2 **CANNOT BE ORDERED STAT** [[id number]] Collected: 12/09/24 1851    Order Status: Sent Specimen: Blood from Peripheral, Antecubital, Right             Jeremiah Giles MD  Department of Hospital Medicine  '[place] - Emergency Dept.          "

## 2024-12-10 NOTE — ED PROVIDER NOTES
"SCRIBE #1 NOTE: I, Naren Weaver, am scribing for, and in the presence of, Wanda Perdue MD. I have scribed the entire note.       History     Chief Complaint   Patient presents with    Allergic Reaction     Pt developed swelling and rash to face after taking a medication from Dr. Caballero for a skin infection. Pt was given multiple meds and developed rash. Unsure which med caused reaction. Cephalexin, Bactroban, Bactrim, amoxicillin 875 mg. Denies difficulty breathing.      Review of patient's allergies indicates:   Allergen Reactions    Adhesive      Other reaction(s): Rash, leaves marked impression on area of contact         History of Present Illness     HPI    12/9/2024, 7:02 PM  History obtained from the patient      History of Present Illness: Nallely Mcclure is a 54 y.o. female patient with a PMHx of anemia, fatty liver disease, fibroids, and HTN who presents to the Emergency Department for evaluation of an allergic reaction which onset November 26th and has worsened over the weekend. Pt reports she first noticed a "pimple-like bump" on the R side of her face on 11/26, which itched. She decided to treat with neosporin, which worsened sxs. She then went to  the following Sunday, where she was prescribed cephalexin and Bactroban. She then saw her PCP, who switched her to amoxicillin 875mg and bactrim. Pt is unsure which of these medicines caused the reaction, but she now c/o generalized itching all over with soft tissue swelling and erythema to the R side of her face. Symptoms are constant and moderate in severity. Pt's pain is exacerbated with opening her mouth. Patient denies any fever, SOB, and all other sxs at this time. Pt also notes she went to the dermatologist 1 week before sxs began, where she was put on doxycycline and metronidazole. No further complaints or concerns at this time.       Arrival mode: Personal vehicle    PCP: Fransisca Chen DO        Past Medical History:  Past Medical History: "   Diagnosis Date    Anemia, unspecified     Fatty liver disease, nonalcoholic     Fibroids     Hypertension        Past Surgical History:  Past Surgical History:   Procedure Laterality Date    BREAST BIOPSY  11/2005    COLONOSCOPY  09/2021    no polyps. recall 10 years    HYSTERECTOMY  2019    OOPHORECTOMY      ABE with LS&O  06/20/2019         Family History:  Family History   Problem Relation Name Age of Onset    Hypertension Mother      Pancreatic cancer Father      Hypertension Sister      Breast cancer Paternal Aunt      Colon cancer Other AUNT     Breast cancer Other AUNT     Ovarian cancer Other AUNT     Diabetes Other         Social History:  Social History     Tobacco Use    Smoking status: Never    Smokeless tobacco: Never   Substance and Sexual Activity    Alcohol use: Yes    Drug use: Never    Sexual activity: Yes        Review of Systems     Review of Systems   Constitutional:  Negative for fever.   HENT:  Negative for sore throat.    Respiratory:  Negative for shortness of breath.    Cardiovascular:  Negative for chest pain.   Gastrointestinal:  Negative for nausea.   Genitourinary:  Negative for dysuria.   Musculoskeletal:  Negative for back pain.   Skin:  Positive for rash (R face with erythema and crusting induration).   Neurological:  Negative for weakness.   Hematological:  Does not bruise/bleed easily.   All other systems reviewed and are negative.     Physical Exam     Initial Vitals [12/09/24 1455]   BP Pulse Resp Temp SpO2   (!) 157/86 90 18 99 °F (37.2 °C) 96 %      MAP       --          Physical Exam  Nursing Notes and Vital Signs Reviewed.  Constitutional: Patient is in no acute distress. Well-developed and well-nourished. Nontoxic appearing.  Head: Atraumatic. Normocephalic.  Eyes: PERRL. EOM intact. Conjunctivae are not pale. No scleral icterus.  ENT: Mucous membranes are moist. Oropharynx is clear and symmetric.  Airway patent. No tongue swelling. No intraoral swelling or infection,    Neck: Supple. Full ROM. Mild submandibular lymphadenopathy.   Cardiovascular: Regular rate. Regular rhythm. No murmurs, rubs, or gallops. Distal pulses are 2+ and symmetric.  Pulmonary/Chest: No respiratory distress. Clear to auscultation bilaterally. No wheezing or rales.   Abdominal: Soft and non-distended.  There is no tenderness.  No rebound, guarding, or rigidity. Good bowel sounds.  Musculoskeletal: Moves all extremities. No obvious deformities. No edema. No calf tenderness.  Skin: Warm and dry. Soft tissue swelling with erythema with crusting and mild induration along R cheek and R mandibular region. No drainage or fluctuance. No crepitance.   Neurological:  Alert, awake, and appropriate.  Normal speech.  No acute focal neurological deficits are appreciated.  Psychiatric: Normal affect. Good eye contact. Appropriate in content.     ED Course   Procedures  ED Vital Signs:  Vitals:    12/10/24 1500 12/10/24 1626 12/10/24 1700 12/10/24 1921   BP:  138/72  116/69   Pulse: 79 82 83 76   Resp:  18  18   Temp:  99.2 °F (37.3 °C)  98.5 °F (36.9 °C)   TempSrc:  Oral  Oral   SpO2:  97%  (!) 93%   Weight:       Height:        12/10/24 1930 12/10/24 2300 12/10/24 2351 12/11/24 0100   BP:   119/71    Pulse: 80 104 66 82   Resp:   18    Temp:   98.3 °F (36.8 °C)    TempSrc:   Oral    SpO2:   99%    Weight:       Height:        12/11/24 0228 12/11/24 0304 12/11/24 0433 12/11/24 0500   BP:   119/77    Pulse:  64 (!) 55 (!) 54   Resp: 16  18    Temp:   97.5 °F (36.4 °C)    TempSrc:   Oral    SpO2:   99%    Weight:   78.5 kg (173 lb 1 oz)    Height:        12/11/24 0745 12/11/24 0806 12/11/24 0944   BP:  122/80    Pulse: (!) 55 (!) 48 79   Resp:  18    Temp:  97.7 °F (36.5 °C)    TempSrc:  Oral    SpO2:  100%    Weight:      Height:          Abnormal Lab Results:  Labs Reviewed   COMPREHENSIVE METABOLIC PANEL - Abnormal       Result Value    Sodium 138      Potassium 3.7      Chloride 103      CO2 22 (*)     Glucose 84       BUN 8      Creatinine 1.3      Calcium 10.7 (*)     Total Protein 9.0 (*)     Albumin 4.6      Total Bilirubin 0.9      Alkaline Phosphatase 94      AST 29      ALT 23      eGFR 48 (*)     Anion Gap 13     HEPATITIS C ANTIBODY    Hepatitis C Ab Negative      Narrative:     Release to patient->Immediate   HEP C VIRUS HOLD SPECIMEN    HEP C Virus Hold Specimen Hold for HCV sendout      Narrative:     Release to patient->Immediate   HIV 1 / 2 ANTIBODY    HIV 1/2 Ag/Ab Negative      Narrative:     Release to patient->Immediate   CBC W/ AUTO DIFFERENTIAL    WBC 7.21      RBC 4.93      Hemoglobin 13.5      Hematocrit 40.3      MCV 82      MCH 27.4      MCHC 33.5      RDW 12.9      Platelets 314      MPV 10.2      Immature Granulocytes 0.3      Gran # (ANC) 4.5      Immature Grans (Abs) 0.02      Lymph # 2.0      Mono # 0.5      Eos # 0.2      Baso # 0.04      nRBC 0      Gran % 62.9      Lymph % 27.0      Mono % 6.8      Eosinophil % 2.4      Basophil % 0.6      Differential Method Automated     LACTIC ACID, PLASMA    Lactate (Lactic Acid) 1.0     PROCALCITONIN    Procalcitonin 0.02          All Lab Results:  Results for orders placed or performed during the hospital encounter of 12/09/24   Hepatitis C Antibody    Collection Time: 12/09/24  3:45 PM   Result Value Ref Range    Hepatitis C Ab Negative Negative   HCV Virus Hold Specimen    Collection Time: 12/09/24  3:45 PM   Result Value Ref Range    HEP C Virus Hold Specimen Hold for HCV sendout    HIV 1/2 Ag/Ab (4th Gen)    Collection Time: 12/09/24  3:45 PM   Result Value Ref Range    HIV 1/2 Ag/Ab Negative Negative   CBC W/ AUTO DIFFERENTIAL    Collection Time: 12/09/24  3:45 PM   Result Value Ref Range    WBC 7.21 3.90 - 12.70 K/uL    RBC 4.93 4.00 - 5.40 M/uL    Hemoglobin 13.5 12.0 - 16.0 g/dL    Hematocrit 40.3 37.0 - 48.5 %    MCV 82 82 - 98 fL    MCH 27.4 27.0 - 31.0 pg    MCHC 33.5 32.0 - 36.0 g/dL    RDW 12.9 11.5 - 14.5 %    Platelets 314 150 - 450 K/uL    MPV  10.2 9.2 - 12.9 fL    Immature Granulocytes 0.3 0.0 - 0.5 %    Gran # (ANC) 4.5 1.8 - 7.7 K/uL    Immature Grans (Abs) 0.02 0.00 - 0.04 K/uL    Lymph # 2.0 1.0 - 4.8 K/uL    Mono # 0.5 0.3 - 1.0 K/uL    Eos # 0.2 0.0 - 0.5 K/uL    Baso # 0.04 0.00 - 0.20 K/uL    nRBC 0 0 /100 WBC    Gran % 62.9 38.0 - 73.0 %    Lymph % 27.0 18.0 - 48.0 %    Mono % 6.8 4.0 - 15.0 %    Eosinophil % 2.4 0.0 - 8.0 %    Basophil % 0.6 0.0 - 1.9 %    Differential Method Automated    Comp. Metabolic Panel    Collection Time: 12/09/24  3:45 PM   Result Value Ref Range    Sodium 138 136 - 145 mmol/L    Potassium 3.7 3.5 - 5.1 mmol/L    Chloride 103 95 - 110 mmol/L    CO2 22 (L) 23 - 29 mmol/L    Glucose 84 70 - 110 mg/dL    BUN 8 6 - 20 mg/dL    Creatinine 1.3 0.5 - 1.4 mg/dL    Calcium 10.7 (H) 8.7 - 10.5 mg/dL    Total Protein 9.0 (H) 6.0 - 8.4 g/dL    Albumin 4.6 3.5 - 5.2 g/dL    Total Bilirubin 0.9 0.1 - 1.0 mg/dL    Alkaline Phosphatase 94 40 - 150 U/L    AST 29 10 - 40 U/L    ALT 23 10 - 44 U/L    eGFR 48 (A) >60 mL/min/1.73 m^2    Anion Gap 13 8 - 16 mmol/L   Lactic acid, plasma    Collection Time: 12/09/24  3:45 PM   Result Value Ref Range    Lactate (Lactic Acid) 1.0 0.5 - 2.2 mmol/L   Blood Culture #1 **CANNOT BE ORDERED STAT**    Collection Time: 12/09/24  6:51 PM    Specimen: Peripheral, Antecubital, Right; Blood   Result Value Ref Range    Blood Culture, Routine No Growth to date     Blood Culture, Routine No Growth to date    Blood Culture #2 **CANNOT BE ORDERED STAT**    Collection Time: 12/09/24  6:51 PM    Specimen: Peripheral, Antecubital, Right; Blood   Result Value Ref Range    Blood Culture, Routine No Growth to date     Blood Culture, Routine No Growth to date    Procalcitonin    Collection Time: 12/09/24  6:52 PM   Result Value Ref Range    Procalcitonin 0.02 <0.25 ng/mL   Comprehensive Metabolic Panel (CMP)    Collection Time: 12/10/24  6:28 AM   Result Value Ref Range    Sodium 140 136 - 145 mmol/L    Potassium 3.7  3.5 - 5.1 mmol/L    Chloride 104 95 - 110 mmol/L    CO2 24 23 - 29 mmol/L    Glucose 81 70 - 110 mg/dL    BUN 9 6 - 20 mg/dL    Creatinine 1.3 0.5 - 1.4 mg/dL    Calcium 9.8 8.7 - 10.5 mg/dL    Total Protein 8.1 6.0 - 8.4 g/dL    Albumin 4.1 3.5 - 5.2 g/dL    Total Bilirubin 1.3 (H) 0.1 - 1.0 mg/dL    Alkaline Phosphatase 90 40 - 150 U/L    AST 27 10 - 40 U/L    ALT 23 10 - 44 U/L    eGFR 48 (A) >60 mL/min/1.73 m^2    Anion Gap 12 8 - 16 mmol/L   CBC with Automated Differential    Collection Time: 12/10/24  6:28 AM   Result Value Ref Range    WBC 6.79 3.90 - 12.70 K/uL    RBC 4.71 4.00 - 5.40 M/uL    Hemoglobin 12.9 12.0 - 16.0 g/dL    Hematocrit 39.8 37.0 - 48.5 %    MCV 85 82 - 98 fL    MCH 27.4 27.0 - 31.0 pg    MCHC 32.4 32.0 - 36.0 g/dL    RDW 12.7 11.5 - 14.5 %    Platelets 316 150 - 450 K/uL    MPV 10.4 9.2 - 12.9 fL    Immature Granulocytes 0.3 0.0 - 0.5 %    Gran # (ANC) 3.3 1.8 - 7.7 K/uL    Immature Grans (Abs) 0.02 0.00 - 0.04 K/uL    Lymph # 2.4 1.0 - 4.8 K/uL    Mono # 0.6 0.3 - 1.0 K/uL    Eos # 0.4 0.0 - 0.5 K/uL    Baso # 0.05 0.00 - 0.20 K/uL    nRBC 0 0 /100 WBC    Gran % 49.3 38.0 - 73.0 %    Lymph % 35.3 18.0 - 48.0 %    Mono % 9.0 4.0 - 15.0 %    Eosinophil % 5.4 0.0 - 8.0 %    Basophil % 0.7 0.0 - 1.9 %    Differential Method Automated    Vancomycin, random    Collection Time: 12/10/24  9:25 AM   Result Value Ref Range    Vancomycin, Random 13.2 Not established ug/mL   Comprehensive Metabolic Panel (CMP)    Collection Time: 12/11/24  6:22 AM   Result Value Ref Range    Sodium 139 136 - 145 mmol/L    Potassium 4.7 3.5 - 5.1 mmol/L    Chloride 106 95 - 110 mmol/L    CO2 24 23 - 29 mmol/L    Glucose 92 70 - 110 mg/dL    BUN 16 6 - 20 mg/dL    Creatinine 1.1 0.5 - 1.4 mg/dL    Calcium 9.0 8.7 - 10.5 mg/dL    Total Protein 6.9 6.0 - 8.4 g/dL    Albumin 3.7 3.5 - 5.2 g/dL    Total Bilirubin 0.9 0.1 - 1.0 mg/dL    Alkaline Phosphatase 89 40 - 150 U/L    AST 20 10 - 40 U/L    ALT 19 10 - 44 U/L     eGFR 59 (A) >60 mL/min/1.73 m^2    Anion Gap 9 8 - 16 mmol/L   CBC with Automated Differential    Collection Time: 12/11/24  6:22 AM   Result Value Ref Range    WBC 6.77 3.90 - 12.70 K/uL    RBC 4.49 4.00 - 5.40 M/uL    Hemoglobin 12.1 12.0 - 16.0 g/dL    Hematocrit 38.3 37.0 - 48.5 %    MCV 85 82 - 98 fL    MCH 26.9 (L) 27.0 - 31.0 pg    MCHC 31.6 (L) 32.0 - 36.0 g/dL    RDW 12.7 11.5 - 14.5 %    Platelets 290 150 - 450 K/uL    MPV 10.1 9.2 - 12.9 fL    Immature Granulocytes 0.3 0.0 - 0.5 %    Gran # (ANC) 3.4 1.8 - 7.7 K/uL    Immature Grans (Abs) 0.02 0.00 - 0.04 K/uL    Lymph # 2.4 1.0 - 4.8 K/uL    Mono # 0.5 0.3 - 1.0 K/uL    Eos # 0.4 0.0 - 0.5 K/uL    Baso # 0.04 0.00 - 0.20 K/uL    nRBC 0 0 /100 WBC    Gran % 49.6 38.0 - 73.0 %    Lymph % 35.6 18.0 - 48.0 %    Mono % 7.4 4.0 - 15.0 %    Eosinophil % 6.5 0.0 - 8.0 %    Basophil % 0.6 0.0 - 1.9 %    Differential Method Automated    Vancomycin, random    Collection Time: 12/11/24  6:22 AM   Result Value Ref Range    Vancomycin, Random 10.7 Not established ug/mL         Imaging Results:  Imaging Results              CT Maxillofacial With Contrast (Final result)  Result time 12/09/24 19:57:37      Final result by Eloina Cardoza MD (12/09/24 19:57:37)                   Impression:    FINDINGS/  Right facial swelling/cellulitis.  No sizable organized abscess fluid collection identified.  Shotty lymph nodes present.  Right TMJ degenerative change present.  Mild sinus opacity present      Electronically signed by: Eloina Cardoza  Date:    12/09/2024  Time:    19:57               Narrative:    EXAMINATION:  CT MAXILLOFACIAL WITH CONTRAST    CLINICAL HISTORY:  Maxillary/facial abscess;    TECHNIQUE:  Low dose axial images, sagittal and coronal reformations were obtained through the face with intravenous contrast enhancement.    COMPARISON:  None                                              The Emergency Provider reviewed the vital signs and test results,  which are outlined above.     ED Discussion       8:24 PM: Discussed case with Dr. Giles ([facility]). Dr. Giles agrees with current care and management of pt and accepts admission.   Admitting Service: [facility] Medicine  Admitting Physician: Dr. Giles  Admit to: Obs Med/Tele    8:24 PM: Re-evaluated pt. I have discussed test results, shared treatment plan, and the need for admission with patient and family at bedside. Pt and family express understanding at this time and agree with all information. All questions answered. Pt and family have no further questions or concerns at this time. Pt is ready for admit.         Medical Decision Making  DDX: 1. Cellulitis of face 2. Impetigo 3. Abscess of Face    Non-toxic appearing, no concern for airway involvement, lab work reviewed and otherwise normal, CT reviewed and consistent with cellulitis, no fluid collection or abscess, hospital med consulted as patient has failed mult rounds of outpatient oral antibiotics, hospital med to admit and start on IV Vancomycin for now.     Amount and/or Complexity of Data Reviewed  External Data Reviewed: notes.     Details: Reviewed recent outpatient visits and several antibiotic changes  Labs: ordered. Decision-making details documented in ED Course.  Radiology: ordered. Decision-making details documented in ED Course.  Discussion of management or test interpretation with external provider(s): 8:24 PM: Discussed case with Dr. Giles ([facility]). Dr. Giles agrees with current care and management of pt and accepts admission.   Admitting Service: [facility] Medicine  Admitting Physician: Dr. Giles  Admit to: Obs Med/Tele      Risk  Prescription drug management.  Decision regarding hospitalization.                ED Medication(s):  Medications   sodium chloride 0.9% flush 10 mL (has no administration in time range)   albuterol-ipratropium 2.5 mg-0.5 mg/3 mL nebulizer solution 3 mL (has no administration in time  range)   melatonin tablet 6 mg (has no administration in time range)   ondansetron injection 4 mg (has no administration in time range)   promethazine tablet 25 mg (has no administration in time range)   senna-docusate 8.6-50 mg per tablet 1 tablet (has no administration in time range)   acetaminophen tablet 650 mg (has no administration in time range)   aluminum-magnesium hydroxide-simethicone 200-200-20 mg/5 mL suspension 30 mL (has no administration in time range)   acetaminophen suppository 650 mg (has no administration in time range)   HYDROcodone-acetaminophen 5-325 mg per tablet 1 tablet (1 tablet Oral Given 12/11/24 0228)   morphine injection 2 mg (has no administration in time range)   naloxone 0.4 mg/mL injection 0.02 mg (has no administration in time range)   glucose chewable tablet 16 g (has no administration in time range)   glucose chewable tablet 24 g (has no administration in time range)   glucagon (human recombinant) injection 1 mg (has no administration in time range)   enoxaparin injection 40 mg (40 mg Subcutaneous Given 12/10/24 1703)   dextrose 10% bolus 125 mL 125 mL (has no administration in time range)   dextrose 10% bolus 250 mL 250 mL (has no administration in time range)   diphenhydrAMINE capsule 25 mg (25 mg Oral Given 12/11/24 0201)   vancomycin - pharmacy to dose (has no administration in time range)   hydrALAZINE injection 10 mg (has no administration in time range)   atorvastatin tablet 10 mg (10 mg Oral Given 12/10/24 2042)   cefdinir capsule 300 mg (300 mg Oral Given 12/11/24 0841)   Lactobacillus acidoph-L.bulgar 1 million cell tablet 4 tablet (4 tablets Oral Given 12/11/24 0841)   multivitamin tablet (1 tablet Oral Given 12/11/24 0841)   artificial tears 0.5 % ophthalmic solution 1 drop (has no administration in time range)   diphenhydrAMINE injection 25 mg (25 mg Intravenous Given 12/9/24 1850)   iohexoL (OMNIPAQUE 350) injection 100 mL (100 mLs Intravenous Given 12/9/24 1940)    vancomycin 1750 mg in 0.9% sodium chloride 500 mL IVPB (0 mg Intravenous Stopped 12/9/24 2330)   vancomycin 1,250 mg in 0.9% NaCl 250 mL IVPB (admixture device) (0 mg Intravenous Stopped 12/10/24 1504)   vancomycin 1750 mg in 0.9% sodium chloride 500 mL IVPB (1,750 mg Intravenous New Bag 12/11/24 0929)       Current Discharge Medication List                  Scribe Attestation:   Scribe #1: I performed the above scribed service and the documentation accurately describes the services I performed. I attest to the accuracy of the note.     Attending:   Physician Attestation Statement for Scribe #1: I, Wanda Perdue MD, personally performed the services described in this documentation, as scribed by Naren Weaver, in my presence, and it is both accurate and complete.           Clinical Impression       ICD-10-CM ICD-9-CM   1. Cellulitis of face  L03.211 682.0   2. Chest pain  R07.9 786.50       Disposition:   Disposition: Placed in Observation  Condition: Stable         Wanda Perdue MD  12/11/24 4973

## 2024-12-10 NOTE — PLAN OF CARE
[facility] - Med Surg  Initial Discharge Assessment       Primary Care Provider: Fransisca Chen DO    Admission Diagnosis: Cellulitis of face [L03.211]  Chest pain [R07.9]    Admission Date: 12/9/2024  Expected Discharge Date: 12/11/24    Transition of Care Barriers: None    Payor: BLUE CROSS BLUE SHIELD / Plan: BCBS OF [facility] LOCAL PLUS / Product Type: Commercial /     Extended Emergency Contact Information  Primary Emergency Contact: RenKaiser  Mobile Phone: [phone]  Relation: Relative    Discharge Plan A: Home         [facility] [address], [place] - [address]  [address] [facility] [address]  Phone: [phone] Fax: [phone]      Initial Assessment (most recent)       Adult Discharge Assessment - 12/10/24 1107          Discharge Assessment    Assessment Type Discharge Planning Assessment     Confirmed/corrected address, phone number and insurance Yes     Confirmed Demographics Correct on Facesheet     Source of Information patient     Communicated SUZANNE with patient/caregiver Date not available/Unable to determine     Reason For Admission facial cellulitis     People in Home alone     Do you expect to return to your current living situation? Yes     Do you have help at home or someone to help you manage your care at home? No     Prior to hospitilization cognitive status: Alert/Oriented     Current cognitive status: Alert/Oriented     Walking or Climbing Stairs Difficulty no     Dressing/Bathing Difficulty no     Home Layout Able to live on 1st floor     Equipment Currently Used at Home none     Readmission within 30 days? No     Patient currently being followed by outpatient case management? No     Do you currently have service(s) that help you manage your care at home? No     Do you take prescription medications? Yes     Do you have prescription coverage? Yes     Coverage BCBS     Do you have any problems affording any of your prescribed medications? No     Is the  patient taking medications as prescribed? yes     Who is going to help you get home at discharge? self     How do you get to doctors appointments? car, drives self     Are you on dialysis? No     Do you take coumadin? No     Discharge Plan A Home     DME Needed Upon Discharge  none     Discharge Plan discussed with: Patient     Transition of Care Barriers None                      Anticipated DC dispo: Home   Prior Level of Function: Independent   People in home:  N/A     Comments:  CM met with patient at bedside to introduce role and discuss discharge planning. CM discharge needs depends on hospital progress. CM will continue following to assist with other needs.

## 2024-12-10 NOTE — ASSESSMENT & PLAN NOTE
Chronic, controlled.  Latest blood pressure and vitals reviewed-   Temp:  [97.7 °F (36.5 °C)-99 °F (37.2 °C)]   Pulse:  []   Resp:  [16-20]   BP: (125-157)/()   SpO2:  [95 %-100 %] .   Home meds for hypertension were reviewed and noted below.   Hypertension Medications               lisinopriL-hydrochlorothiazide (PRINZIDE,ZESTORETIC) 20-12.5 mg per tablet Take 1 tablet by mouth once daily.     While in the hospital, will manage blood pressure as follows; Continue home antihypertensive regimen    Will utilize p.r.n. blood pressure medication only if patient's blood pressure greater than  180/110 and she develops symptoms such as worsening chest pain or shortness of breath.

## 2024-12-10 NOTE — SUBJECTIVE & OBJECTIVE
Interval History:     No acute events, patient resting comfortably   Alert and oriented x3   Hemodynamically stable   Afebrile, no leukocytosis   Currently on IV antibiotics, still with facial swelling, discomfort, will monitor overnight, based on symptom improvement, culture results, anticipate discharge in next 24-48 hours   Blood culture x2 negative to date       Review of Systems  Objective:     Vital Signs (Most Recent):  Temp: 98.4 °F (36.9 °C) (12/10/24 1220)  Pulse: 77 (12/10/24 1220)  Resp: 18 (12/10/24 1220)  BP: 119/71 (12/10/24 1220)  SpO2: 98 % (12/10/24 1220) Vital Signs (24h Range):  Temp:  [97.7 °F (36.5 °C)-99 °F (37.2 °C)] 98.4 °F (36.9 °C)  Pulse:  [57-92] 77  Resp:  [16-20] 18  SpO2:  [95 %-100 %] 98 %  BP: (113-157)/() 119/71     Weight: 82.8 kg (182 lb 8.7 oz)  Body mass index is 29.46 kg/m².    Intake/Output Summary (Last 24 hours) at 12/10/2024 1428  Last data filed at 12/10/2024 1357  Gross per 24 hour   Intake 490 ml   Output 0 ml   Net 490 ml         Physical Exam      Constitutional:       General: She is not in acute distress.     Appearance: Normal appearance. She is normal weight. She is not ill-appearing, toxic-appearing or diaphoretic.   HENT:      Head: Normocephalic and atraumatic.      Comments: Diffuse right-sided facial swelling noted with mild TTP throughout without evidence of fluctuance, open wounds/drainage noted.  Numerous papuels noted through face. (see media for visual depiction)     Right Ear: External ear normal.      Left Ear: External ear normal.      Nose: Nose normal. No congestion or rhinorrhea.      Mouth/Throat:      Mouth: Mucous membranes are moist.      Pharynx: Oropharynx is clear. No oropharyngeal exudate or posterior oropharyngeal erythema.   Eyes:      General: No scleral icterus.     Extraocular Movements: Extraocular movements intact.      Conjunctiva/sclera: Conjunctivae normal.      Pupils: Pupils are equal, round, and reactive to light.   Neck:       Vascular: No carotid bruit.   Cardiovascular:      Rate and Rhythm: Normal rate and regular rhythm.      Pulses: Normal pulses.      Heart sounds: Normal heart sounds. No murmur heard.     No friction rub. No gallop.   Pulmonary:      Effort: Pulmonary effort is normal. No respiratory distress.      Breath sounds: Normal breath sounds. No stridor. No wheezing, rhonchi or rales.   Chest:      Chest wall: No tenderness.   Abdominal:      General: Abdomen is flat. Bowel sounds are normal. There is no distension.      Palpations: Abdomen is soft. There is no mass.      Tenderness: There is no abdominal tenderness. There is no right CVA tenderness, left CVA tenderness, guarding or rebound.      Hernia: No hernia is present.   Musculoskeletal:         General: No swelling, tenderness, deformity or signs of injury. Normal range of motion.      Cervical back: Normal range of motion and neck supple. No rigidity or tenderness.      Right lower leg: No edema.      Left lower leg: No edema.   Lymphadenopathy:      Cervical: No cervical adenopathy.   Skin:     General: Skin is warm and dry.      Capillary Refill: Capillary refill takes less than 2 seconds.      Coloration: Skin is not jaundiced or pale.      Findings: Rash present. No bruising, erythema or lesion.      Comments: Skin lesions as noted in HEENT suction. (see media for visual depiction)   Neurological:      General: No focal deficit present.      Mental Status: She is alert and oriented to person, place, and time. Mental status is at baseline.      Cranial Nerves: No cranial nerve deficit.      Sensory: No sensory deficit.      Motor: No weakness.      Coordination: Coordination normal.   Psychiatric:         Mood and Affect: Mood normal.         Behavior: Behavior normal.         Thought Content: Thought content normal.         Judgment: Judgment normal.         Significant Labs: All pertinent labs within the past 24 hours have been reviewed.  CBC:   Recent  Labs   Lab 12/09/24  1545 12/10/24  0628   WBC 7.21 6.79   HGB 13.5 12.9   HCT 40.3 39.8    316     CMP:   Recent Labs   Lab 12/09/24  1545 12/10/24  0628    140   K 3.7 3.7    104   CO2 22* 24   GLU 84 81   BUN 8 9   CREATININE 1.3 1.3   CALCIUM 10.7* 9.8   PROT 9.0* 8.1   ALBUMIN 4.6 4.1   BILITOT 0.9 1.3*   ALKPHOS 94 90   AST 29 27   ALT 23 23   ANIONGAP 13 12       Significant Imaging:     Imaging Results              CT Maxillofacial With Contrast (Final result)  Result time 12/09/24 19:57:37      Final result by Eloina Cardoza MD (12/09/24 19:57:37)                   Impression:    FINDINGS/  Right facial swelling/cellulitis.  No sizable organized abscess fluid collection identified.  Shotty lymph nodes present.  Right TMJ degenerative change present.  Mild sinus opacity present      Electronically signed by: Eloina Cardoza  Date:    12/09/2024  Time:    19:57               Narrative:    EXAMINATION:  CT MAXILLOFACIAL WITH CONTRAST    CLINICAL HISTORY:  Maxillary/facial abscess;    TECHNIQUE:  Low dose axial images, sagittal and coronal reformations were obtained through the face with intravenous contrast enhancement.    COMPARISON:  None

## 2024-12-10 NOTE — SUBJECTIVE & OBJECTIVE
Past Medical History:   Diagnosis Date    Anemia, unspecified     Fatty liver disease, nonalcoholic     Fibroids     Hypertension        Past Surgical History:   Procedure Laterality Date    BREAST BIOPSY  11/2005    COLONOSCOPY  09/2021    no polyps. recall 10 years    HYSTERECTOMY  2019    OOPHORECTOMY      ABE with LS&O  06/20/2019       Review of patient's allergies indicates:   Allergen Reactions    Adhesive      Other reaction(s): Rash, leaves marked impression on area of contact       No current facility-administered medications on file prior to encounter.     Current Outpatient Medications on File Prior to Encounter   Medication Sig    amoxicillin (AMOXIL) 875 MG tablet Take 1 tablet (875 mg total) by mouth every 12 (twelve) hours.    atorvastatin (LIPITOR) 10 MG tablet Take 1 tablet (10 mg total) by mouth once daily.    cholecalciferol, vitamin D3, (VITAMIN D3) 50 mcg (2,000 unit) Cap capsule Take 1 capsule by mouth once daily.    doxycycline (VIBRA-TABS) 100 MG tablet Take 1 tablet (100 mg total) by mouth once daily. (Patient not taking: Reported on 12/9/2024)    doxycycline (VIBRA-TABS) 100 MG tablet Take 1 tablet (100 mg total) by mouth every 12 (twelve) hours. for 7 days (Patient not taking: Reported on 12/9/2024)    ergocalciferol, vitamin D2, (VITAMIN D ORAL) Vitamin D    estradioL (ESTRACE) 0.01 % (0.1 mg/gram) vaginal cream Insert 1 gram vaginally hs x 14, then twice weekly (Patient not taking: Reported on 12/9/2024)    fish,bora,flax oils-om3,6,9no1 (OMEGA 3-6-9) 1,200 mg Cap Omega 3    ketorolac (TORADOL) 10 mg tablet Take 10 mg by mouth every 8 (eight) hours as needed for Pain. (Patient not taking: Reported on 12/9/2024)    LINZESS 145 mcg Cap capsule Take 145 mcg by mouth nightly.    lisinopriL-hydrochlorothiazide (PRINZIDE,ZESTORETIC) 20-12.5 mg per tablet Take 1 tablet by mouth once daily.    metronidazole 0.75% (METROCREAM) 0.75 % Crea Apply topically 2 (two) times daily. (Patient not taking:  Reported on 12/9/2024)    multivit with minerals/lutein (MULTIVITAMIN 50 PLUS ORAL) Multivitamin    multivitamin (THERAGRAN) per tablet Take 1 tablet by mouth once daily.    mupirocin (BACTROBAN) 2 % ointment Apply topically as needed. (Patient not taking: Reported on 12/9/2024)    omega-3 fatty acids/fish oil (FISH OIL-OMEGA-3 FATTY ACIDS) 300-1,000 mg capsule Take by mouth once daily.    sulfamethoxazole-trimethoprim 800-160mg (BACTRIM DS) 800-160 mg Tab Take 1 tablet by mouth 2 (two) times daily. (Patient not taking: Reported on 12/9/2024)    tacrolimus (PROTOPIC) 0.1 % ointment AAA bid prn rash of face/body.  Non-steroid. Safe to use long-term (Patient not taking: Reported on 12/9/2024)    triamcinolone acetonide 0.025% (KENALOG) 0.025 % Oint AAA bid prn rashes on face. Mild steroid. (Patient not taking: Reported on 12/9/2024)     Family History       Problem Relation (Age of Onset)    Breast cancer Paternal Aunt, Other    Colon cancer Other    Diabetes Other    Hypertension Mother, Sister    Ovarian cancer Other    Pancreatic cancer Father          Tobacco Use    Smoking status: Never    Smokeless tobacco: Never   Substance and Sexual Activity    Alcohol use: Yes    Drug use: Never    Sexual activity: Yes     Review of Systems   All other systems reviewed and are negative.    Objective:     Vital Signs (Most Recent):  Temp: 99 °F (37.2 °C) (12/09/24 1455)  Pulse: 82 (12/09/24 2003)  Resp: 18 (12/09/24 1455)  BP: (!) 148/100 (12/09/24 2003)  SpO2: 99 % (12/09/24 2003) Vital Signs (24h Range):  Temp:  [98.3 °F (36.8 °C)-99 °F (37.2 °C)] 99 °F (37.2 °C)  Pulse:  [] 82  Resp:  [18-20] 18  SpO2:  [96 %-100 %] 99 %  BP: (129-157)/() 148/100     Weight: 82.8 kg (182 lb 8.7 oz)  Body mass index is 29.46 kg/m².     Physical Exam  Vitals reviewed.   Constitutional:       General: She is not in acute distress.     Appearance: Normal appearance. She is normal weight. She is not ill-appearing, toxic-appearing or  diaphoretic.   HENT:      Head: Normocephalic and atraumatic.      Comments: Diffuse right-sided facial swelling noted with mild TTP throughout without evidence of fluctuance, open wounds/drainage noted.  Numerous papuels noted through face. (see media for visual depiction)     Right Ear: External ear normal.      Left Ear: External ear normal.      Nose: Nose normal. No congestion or rhinorrhea.      Mouth/Throat:      Mouth: Mucous membranes are moist.      Pharynx: Oropharynx is clear. No oropharyngeal exudate or posterior oropharyngeal erythema.   Eyes:      General: No scleral icterus.     Extraocular Movements: Extraocular movements intact.      Conjunctiva/sclera: Conjunctivae normal.      Pupils: Pupils are equal, round, and reactive to light.   Neck:      Vascular: No carotid bruit.   Cardiovascular:      Rate and Rhythm: Normal rate and regular rhythm.      Pulses: Normal pulses.      Heart sounds: Normal heart sounds. No murmur heard.     No friction rub. No gallop.   Pulmonary:      Effort: Pulmonary effort is normal. No respiratory distress.      Breath sounds: Normal breath sounds. No stridor. No wheezing, rhonchi or rales.   Chest:      Chest wall: No tenderness.   Abdominal:      General: Abdomen is flat. Bowel sounds are normal. There is no distension.      Palpations: Abdomen is soft. There is no mass.      Tenderness: There is no abdominal tenderness. There is no right CVA tenderness, left CVA tenderness, guarding or rebound.      Hernia: No hernia is present.   Musculoskeletal:         General: No swelling, tenderness, deformity or signs of injury. Normal range of motion.      Cervical back: Normal range of motion and neck supple. No rigidity or tenderness.      Right lower leg: No edema.      Left lower leg: No edema.   Lymphadenopathy:      Cervical: No cervical adenopathy.   Skin:     General: Skin is warm and dry.      Capillary Refill: Capillary refill takes less than 2 seconds.       Coloration: Skin is not jaundiced or pale.      Findings: Rash present. No bruising, erythema or lesion.      Comments: Skin lesions as noted in HEENT suction. (see media for visual depiction)   Neurological:      General: No focal deficit present.      Mental Status: She is alert and oriented to person, place, and time. Mental status is at baseline.      Cranial Nerves: No cranial nerve deficit.      Sensory: No sensory deficit.      Motor: No weakness.      Coordination: Coordination normal.   Psychiatric:         Mood and Affect: Mood normal.         Behavior: Behavior normal.         Thought Content: Thought content normal.         Judgment: Judgment normal.              CRANIAL NERVES     CN III, IV, VI   Pupils are equal, round, and reactive to light.               Significant Labs: All pertinent labs within the past 24 hours have been reviewed.    Significant Imaging: I have reviewed all pertinent imaging results/findings within the past 24 hours.    LABS:  Recent Results (from the past 24 hours)   Hepatitis C Antibody    Collection Time: 12/09/24  3:45 PM   Result Value Ref Range    Hepatitis C Ab Negative Negative   HCV Virus Hold Specimen    Collection Time: 12/09/24  3:45 PM   Result Value Ref Range    HEP C Virus Hold Specimen Hold for HCV sendout    HIV 1/2 Ag/Ab (4th Gen)    Collection Time: 12/09/24  3:45 PM   Result Value Ref Range    HIV 1/2 Ag/Ab Negative Negative   CBC W/ AUTO DIFFERENTIAL    Collection Time: 12/09/24  3:45 PM   Result Value Ref Range    WBC 7.21 3.90 - 12.70 K/uL    RBC 4.93 4.00 - 5.40 M/uL    Hemoglobin 13.5 12.0 - 16.0 g/dL    Hematocrit 40.3 37.0 - 48.5 %    MCV 82 82 - 98 fL    MCH 27.4 27.0 - 31.0 pg    MCHC 33.5 32.0 - 36.0 g/dL    RDW 12.9 11.5 - 14.5 %    Platelets 314 150 - 450 K/uL    MPV 10.2 9.2 - 12.9 fL    Immature Granulocytes 0.3 0.0 - 0.5 %    Gran # (ANC) 4.5 1.8 - 7.7 K/uL    Immature Grans (Abs) 0.02 0.00 - 0.04 K/uL    Lymph # 2.0 1.0 - 4.8 K/uL    Mono #  0.5 0.3 - 1.0 K/uL    Eos # 0.2 0.0 - 0.5 K/uL    Baso # 0.04 0.00 - 0.20 K/uL    nRBC 0 0 /100 WBC    Gran % 62.9 38.0 - 73.0 %    Lymph % 27.0 18.0 - 48.0 %    Mono % 6.8 4.0 - 15.0 %    Eosinophil % 2.4 0.0 - 8.0 %    Basophil % 0.6 0.0 - 1.9 %    Differential Method Automated    Comp. Metabolic Panel    Collection Time: 12/09/24  3:45 PM   Result Value Ref Range    Sodium 138 136 - 145 mmol/L    Potassium 3.7 3.5 - 5.1 mmol/L    Chloride 103 95 - 110 mmol/L    CO2 22 (L) 23 - 29 mmol/L    Glucose 84 70 - 110 mg/dL    BUN 8 6 - 20 mg/dL    Creatinine 1.3 0.5 - 1.4 mg/dL    Calcium 10.7 (H) 8.7 - 10.5 mg/dL    Total Protein 9.0 (H) 6.0 - 8.4 g/dL    Albumin 4.6 3.5 - 5.2 g/dL    Total Bilirubin 0.9 0.1 - 1.0 mg/dL    Alkaline Phosphatase 94 40 - 150 U/L    AST 29 10 - 40 U/L    ALT 23 10 - 44 U/L    eGFR 48 (A) >60 mL/min/1.73 m^2    Anion Gap 13 8 - 16 mmol/L   Lactic acid, plasma    Collection Time: 12/09/24  3:45 PM   Result Value Ref Range    Lactate (Lactic Acid) 1.0 0.5 - 2.2 mmol/L   Procalcitonin    Collection Time: 12/09/24  6:52 PM   Result Value Ref Range    Procalcitonin 0.02 <0.25 ng/mL       RADIOLOGY  CT Maxillofacial With Contrast    Result Date: 12/9/2024  EXAMINATION: CT MAXILLOFACIAL WITH CONTRAST CLINICAL HISTORY: Maxillary/facial abscess; TECHNIQUE: Low dose axial images, sagittal and coronal reformations were obtained through the face with intravenous contrast enhancement. COMPARISON: None     FINDINGS/ Right facial swelling/cellulitis.  No sizable organized abscess fluid collection identified.  Shotty lymph nodes present.  Right TMJ degenerative change present.  Mild sinus opacity present Electronically signed by: Eloina Cardoza Date:    12/09/2024 Time:    19:57    US Soft Tissue Head Neck    Result Date: 12/5/2024  EXAMINATION: US SOFT TISSUE HEAD NECK CLINICAL HISTORY: Disorder of the skin and subcutaneous tissue, unspecified TECHNIQUE: Limited ultrasound of the right face/cheek area  COMPARISON: None FINDINGS: No discrete mass or fluid collection.  Shotty lymph nodes are seen within the region of submandibular and submental region measuring up to 10 mm.     No significant abnormalities Electronically signed by: Hayden Guzman MD Date:    12/05/2024 Time:    10:55      EKG    MICROBIOLOGY    MDM

## 2024-12-10 NOTE — HPI
Nallely Mcclure is a 54 y.o. female with a PMH  has a past medical history of Anemia, unspecified, Fatty liver disease, nonalcoholic, Fibroids, and Hypertension. who presented to the ED for further evaluation of potential drug reaction and/or facial cellulitis.  Patient reported being seen in the ED, urgent care, as well as by her PCP and dermatologist regarding right facial swelling and numerous pimple like bumps since November.  She reported being prescribed Bactroban and metronidazole which was switch to cephalexin and Bactroban, then switched again to amoxicillin and Bactrim.  After initiation of her most recent antibiotics, she reported endorsing diffuse itching with her urticaria and worsening facial swelling in itching as well prompting her to come to the ED to be further evaluated.  Patient reported also being seen by rheumatologist outpatient following positive GODWIN test with the extended panel pain negative inpatient being told she does not need to worry about her GODWIN being positive as she has negative for autoimmune processes.  Since onset of symptoms, patient denied endorsing any lightheadedness, dizziness, headache, visual changes, fever, chills, sweats, throat swelling, dysphagia, nausea, vomiting, chest pain, shortness a breath, abdominal pain, dysuria, hematuria, melena, hematochezia, diarrhea, or onset neurological deficits.  Patient does report the right side of face feeling tight causing pressure which is now extending to the left side of her face with pain exacerbated with opening her mouth however, reports all other review of systems negative except as noted above.  Initial workup in the ED revealed patient to be afebrile without leukocytosis, CBC and CMP fairly unremarkable with the exception of creatinine/GFR measuring 1.3/48.  Lactic acid and procalcitonin within normal limits.  CT maxillofacial positive for right-sided facial cellulitis with no sizable organized abscess fluid collection  identified however did show shotty lymph nodes present in addition to right TMA degenerative changes noted and mild sinus opacities present.  Patient initiated on vancomycin and admitted to Hospital Medicine under observation for continued medical management.    PCP: Fransisca Chen

## 2024-12-10 NOTE — CONSULTS
[facility] Surg  Wound Care    Patient Name:  Nallely Mcclure   MRN:  [id number]  Date: 12/10/2024  Diagnosis: Facial cellulitis    History:     Past Medical History:   Diagnosis Date    Anemia, unspecified     Fatty liver disease, nonalcoholic     Fibroids     Hypertension        Social History     Socioeconomic History    Marital status: Single   Tobacco Use    Smoking status: Never    Smokeless tobacco: Never   Substance and Sexual Activity    Alcohol use: Yes    Drug use: Never    Sexual activity: Yes     Social Drivers of Health     Financial Resource Strain: Low Risk  (12/10/2024)    Overall Financial Resource Strain (CARDIA)     Difficulty of Paying Living Expenses: Not hard at all   Food Insecurity: No Food Insecurity (12/10/2024)    Hunger Vital Sign     Worried About Running Out of Food in the Last Year: Never true     Ran Out of Food in the Last Year: Never true   Transportation Needs: No Transportation Needs (12/10/2024)    TRANSPORTATION NEEDS     Transportation : No   Stress: No Stress Concern Present (12/10/2024)    [employer] - Occupational Stress Questionnaire     Feeling of Stress : Not at all   Housing Stability: Low Risk  (12/10/2024)    Housing Stability Vital Sign     Unable to Pay for Housing in the Last Year: No     Homeless in the Last Year: No       Precautions:     Allergies as of 12/09/2024 - Reviewed 12/09/2024   Allergen Reaction Noted    Adhesive  09/28/2021       [facility] Assessment Details/Treatment     Consulted on this 53 y/o female patient for cellulitis to the right cheek. Patient was admitted 12/9/24 for fascial cellulitis. PMH significant for anemia, fatty liver disease, fibroids, and hypertension.   Patient resting in the bed, awake and alert.   Noted area of dry skaly brown discoloration to the right cheek. No open wound present and patient states it itches at times. No drainage noted. No redness or warmth noted. Gently Cleansed with saline, patted  dry. Applied moisturizer to dry areas.   Patient states she did see dermatology outpatient but it was prior to this starting and she has not seen her dermatologist for this current problem. Patient is currently being treated with abx for the cellulitis. Recommend patient F/U with dermatology outpatient once discharged.   Moisturizer left at bedside for patient to apply daily after gentle cleansing.          12/10/24 1310   WOCN Assessment   WOCN Total Time (mins) 45   Visit Date 12/10/24   Visit Time 1310   Consult Type New   WOCN Speciality Wound   Wound cellulitis   Intervention assessed;changed;applied;chart review;orders   Teaching on-going       12/10/2024

## 2024-12-10 NOTE — PLAN OF CARE
Discussed poc with pt, pt verbalized understanding    Purposeful rounding every 2hours    VS wnl  Cardiac monitoring in use, pt is NSR, tele monitor # [id number]    Fall precautions in place, remains injury free  Pt denies c/o nausea  Pain under control with PRN meds      Accurate I&Os  Abx given as prescribed  Bed locked at lowest position  Call light within reach    Chart check complete  Will cont with POC

## 2024-12-10 NOTE — CONSULTS
Pharmacokinetic Assessment Follow Up: IV Vancomycin    Vancomycin serum concentration assessment(s):    The random level was drawn correctly and can be used to guide therapy at this time. The measurement is within the desired definitive target range of 10 to 15 mcg/mL.    Vancomycin Regimen Plan:    Re-dose when the random level is less than 20 mcg/mL, next level to be drawn at 0615 on 1/11    Drug levels (last 3 results):  Recent Labs   Lab Result Units 12/10/24  0925   Vancomycin, Random ug/mL 13.2       Pharmacy will continue to follow and monitor vancomycin.    Please contact pharmacy at extension [phone] for questions regarding this assessment.    Thank you for the consult,   Sydnee Rivera, PharmD 12/10/2024 1:30 PM         Patient brief summary:  Nallely Mcclure is a 54 y.o. female initiated on antimicrobial therapy with IV Vancomycin for treatment of skin & soft tissue infection    The patient's current regimen is pulse dosing (dosing by level) when random level is less than 20 mcg/mL.      Drug Allergies:   Review of patient's allergies indicates:   Allergen Reactions    Adhesive      Other reaction(s): Rash, leaves marked impression on area of contact       Actual Body Weight:   82.8 kg    Renal Function:   Estimated Creatinine Clearance: 52.4 mL/min (based on SCr of 1.3 mg/dL).,     Dialysis Method (if applicable):  N/A    CBC (last 72 hours):  Recent Labs   Lab Result Units 12/09/24  1545 12/10/24  0628   WBC K/uL 7.21 6.79   Hemoglobin g/dL 13.5 12.9   Hematocrit % 40.3 39.8   Platelets K/uL 314 316   Gran % % 62.9 49.3   Lymph % % 27.0 35.3   Mono % % 6.8 9.0   Eosinophil % % 2.4 5.4   Basophil % % 0.6 0.7   Differential Method  Automated Automated       Metabolic Panel (last 72 hours):  Recent Labs   Lab Result Units 12/09/24  1545 12/10/24  0628   Sodium mmol/L 138 140   Potassium mmol/L 3.7 3.7   Chloride mmol/L 103 104   CO2 mmol/L 22* 24   Glucose mg/dL 84 81   BUN mg/dL 8 9   Creatinine mg/dL  1.3 1.3   Albumin g/dL 4.6 4.1   Total Bilirubin mg/dL 0.9 1.3*   Alkaline Phosphatase U/L 94 90   AST U/L 29 27   ALT U/L 23 23       Vancomycin Administrations:  vancomycin given in the last 96 hours                     vancomycin 1,250 mg in 0.9% NaCl 250 mL IVPB (admixture device) (mg) 1,250 mg New Bag 12/10/24 1211    vancomycin 1750 mg in 0.9% sodium chloride 500 mL IVPB (mg) 1,750 mg New Bag 12/09/24 2130                    Microbiologic Results:  Microbiology Results (last 7 days)       Procedure Component Value Units Date/Time    Blood Culture #1 **CANNOT BE ORDERED STAT** [[id number]] Collected: 12/09/24 1851    Order Status: Completed Specimen: Blood from Peripheral, Antecubital, Right Updated: 12/10/24 0715     Blood Culture, Routine No Growth to date    Blood Culture #2 **CANNOT BE ORDERED STAT** [[id number]] Collected: 12/09/24 1851    Order Status: Completed Specimen: Blood from Peripheral, Antecubital, Right Updated: 12/10/24 0715     Blood Culture, Routine No Growth to date

## 2024-12-10 NOTE — HOSPITAL COURSE
Admitted under Hospital Medicine for right-sided facial cellulitis   Currently on IV antibiotics   Follow up on blood cultures-- cultures remain negative to date   Ultrasound head and neck as of 12/11/2024 did not show abscess/pus collection    We will/11/24   Examination of patient done at bedside, appeared alert and oriented x3,   Denied acute events overnight   Remained afebrile, no leukocytosis   Denied fever, chills, chest pain, shortness O breath, nausea, vomiting, bowel or bladder issues   Blood culture x2 remained negative to date   Able to swallow without issues,;  stated improvement in pain/discomfort/swelling over right facial cellulitis area  Received IV antibiotics during hospital stay   Considering clinical and hemodynamic stability, planning to discharge patient today on oral antibiotics, emphasized on compliance with medications, outpatient follow up with PCP upon discharge, patient expressed understanding, agreed with the plan   Also follows with dermatology outpatient, emphasized on follow up visits;

## 2024-12-10 NOTE — ASSESSMENT & PLAN NOTE
Patient is chronically on statin.will continue for now. Last Lipid Panel:   Lab Results   Component Value Date    CHOL 115 (L) 09/27/2024    HDL 43 09/27/2024    LDLCALC 66.0 09/27/2024    TRIG 30 09/27/2024    CHOLHDL 37.4 09/27/2024   Plan:  -Continue home medication  -low fat/low calorie diet

## 2024-12-10 NOTE — PROGRESS NOTES
"Pharmacokinetic Initial Assessment: IV Vancomycin    Assessment/Plan:    Initiate intravenous vancomycin with loading dose of 1750 mg once with subsequent doses when random concentrations are less than 15 mcg/mL  Desired empiric serum trough concentration is 10 to 15 mcg/mL  Draw vancomycin random level on 12/10 at 0930.  Pharmacy will continue to follow and monitor vancomycin.      Please contact pharmacy at extension [phone] with any questions regarding this assessment.     Thank you for the consult,   Era Villagomez       Patient brief summary:  Nallely Mcclure is a 54 y.o. female initiated on antimicrobial therapy with IV Vancomycin for treatment of suspected skin & soft tissue infection    Drug Allergies:   Review of patient's allergies indicates:   Allergen Reactions    Adhesive      Other reaction(s): Rash, leaves marked impression on area of contact       Actual Body Weight:   82.8 kg    Renal Function:   Estimated Creatinine Clearance: 52.4 mL/min (based on SCr of 1.3 mg/dL).,     Dialysis Method (if applicable):  N/A    CBC (last 72 hours):  Recent Labs   Lab Result Units 12/09/24  1545   WBC K/uL 7.21   Hemoglobin g/dL 13.5   Hematocrit % 40.3   Platelets K/uL 314   Gran % % 62.9   Lymph % % 27.0   Mono % % 6.8   Eosinophil % % 2.4   Basophil % % 0.6   Differential Method  Automated       Metabolic Panel (last 72 hours):  Recent Labs   Lab Result Units 12/09/24  1545   Sodium mmol/L 138   Potassium mmol/L 3.7   Chloride mmol/L 103   CO2 mmol/L 22*   Glucose mg/dL 84   BUN mg/dL 8   Creatinine mg/dL 1.3   Albumin g/dL 4.6   Total Bilirubin mg/dL 0.9   Alkaline Phosphatase U/L 94   AST U/L 29   ALT U/L 23       Drug levels (last 3 results):  No results for input(s): "VANCOMYCINRA", "VANCORANDOM", "VANCOMYCINPE", "VANCOPEAK", "VANCOMYCINTR", "VANCOTROUGH" in the last 72 hours.    Microbiologic Results:  Microbiology Results (last 7 days)       Procedure Component Value Units Date/Time    Blood Culture #1 " **CANNOT BE ORDERED STAT** [[id number]] Collected: 12/09/24 1851    Order Status: Sent Specimen: Blood from Peripheral, Antecubital, Right     Blood Culture #2 **CANNOT BE ORDERED STAT** [[id number]] Collected: 12/09/24 1851    Order Status: Sent Specimen: Blood from Peripheral, Antecubital, Right

## 2024-12-10 NOTE — ASSESSMENT & PLAN NOTE
Patient presented with worsening facial swelling and rash times 3 weeks duration and was found to have evidence of cellulitis on CT maxillofacial.  Initial concerns for adverse drug reaction given multiple trials of antibiotics with patient receiving Benadryl prior to admission.  Patient seen by Dermatology outpatient and was being treated for rosacea as well as urgent care PCP for treatment of acne/cellulitis.  Initial workup in the ED revealed patient to be afebrile without leukocytosis, CBC and CMP fairly unremarkable with the exception of creatinine/GFR measuring 1.3/48.  Lactic acid and procalcitonin within normal limits.  CT maxillofacial positive for right-sided facial cellulitis with no sizable organized abscess fluid collection identified however did show shotty lymph nodes present in addition to right TMA degenerative changes noted and mild sinus opacities present.  Patient initiated on vancomycin and prn benadryl. Will her observe for response on antibiotics prior to initiating steroids and/or reaching out to Dermatology for further recommendations.  Plan:  -continue antibiotics  -continue benadryl p.r.n.  -monitor labs  -consider steroids and dermatology consult if symptoms fail to improve

## 2024-12-11 ENCOUNTER — TELEPHONE (OUTPATIENT)
Dept: DERMATOLOGY | Facility: CLINIC | Age: 54
End: 2024-12-11
Payer: COMMERCIAL

## 2024-12-11 VITALS
RESPIRATION RATE: 18 BRPM | TEMPERATURE: 98 F | OXYGEN SATURATION: 100 % | DIASTOLIC BLOOD PRESSURE: 81 MMHG | WEIGHT: 173.06 LBS | BODY MASS INDEX: 27.81 KG/M2 | HEART RATE: 80 BPM | SYSTOLIC BLOOD PRESSURE: 144 MMHG | HEIGHT: 66 IN

## 2024-12-11 LAB
ALBUMIN SERPL BCP-MCNC: 3.7 G/DL (ref 3.5–5.2)
ALP SERPL-CCNC: 89 U/L (ref 40–150)
ALT SERPL W/O P-5'-P-CCNC: 19 U/L (ref 10–44)
ANION GAP SERPL CALC-SCNC: 9 MMOL/L (ref 8–16)
AST SERPL-CCNC: 20 U/L (ref 10–40)
BASOPHILS # BLD AUTO: 0.04 K/UL (ref 0–0.2)
BASOPHILS NFR BLD: 0.6 % (ref 0–1.9)
BILIRUB SERPL-MCNC: 0.9 MG/DL (ref 0.1–1)
BUN SERPL-MCNC: 16 MG/DL (ref 6–20)
CALCIUM SERPL-MCNC: 9 MG/DL (ref 8.7–10.5)
CHLORIDE SERPL-SCNC: 106 MMOL/L (ref 95–110)
CO2 SERPL-SCNC: 24 MMOL/L (ref 23–29)
CREAT SERPL-MCNC: 1.1 MG/DL (ref 0.5–1.4)
DIFFERENTIAL METHOD BLD: ABNORMAL
EOSINOPHIL # BLD AUTO: 0.4 K/UL (ref 0–0.5)
EOSINOPHIL NFR BLD: 6.5 % (ref 0–8)
ERYTHROCYTE [DISTWIDTH] IN BLOOD BY AUTOMATED COUNT: 12.7 % (ref 11.5–14.5)
EST. GFR  (NO RACE VARIABLE): 59 ML/MIN/1.73 M^2
GLUCOSE SERPL-MCNC: 92 MG/DL (ref 70–110)
HCT VFR BLD AUTO: 38.3 % (ref 37–48.5)
HGB BLD-MCNC: 12.1 G/DL (ref 12–16)
IMM GRANULOCYTES # BLD AUTO: 0.02 K/UL (ref 0–0.04)
IMM GRANULOCYTES NFR BLD AUTO: 0.3 % (ref 0–0.5)
LYMPHOCYTES # BLD AUTO: 2.4 K/UL (ref 1–4.8)
LYMPHOCYTES NFR BLD: 35.6 % (ref 18–48)
MCH RBC QN AUTO: 26.9 PG (ref 27–31)
MCHC RBC AUTO-ENTMCNC: 31.6 G/DL (ref 32–36)
MCV RBC AUTO: 85 FL (ref 82–98)
MONOCYTES # BLD AUTO: 0.5 K/UL (ref 0.3–1)
MONOCYTES NFR BLD: 7.4 % (ref 4–15)
NEUTROPHILS # BLD AUTO: 3.4 K/UL (ref 1.8–7.7)
NEUTROPHILS NFR BLD: 49.6 % (ref 38–73)
NRBC BLD-RTO: 0 /100 WBC
PLATELET # BLD AUTO: 290 K/UL (ref 150–450)
PMV BLD AUTO: 10.1 FL (ref 9.2–12.9)
POTASSIUM SERPL-SCNC: 4.7 MMOL/L (ref 3.5–5.1)
PROT SERPL-MCNC: 6.9 G/DL (ref 6–8.4)
RBC # BLD AUTO: 4.49 M/UL (ref 4–5.4)
SODIUM SERPL-SCNC: 139 MMOL/L (ref 136–145)
VANCOMYCIN SERPL-MCNC: 10.7 UG/ML
WBC # BLD AUTO: 6.77 K/UL (ref 3.9–12.7)

## 2024-12-11 PROCEDURE — 63600175 PHARM REV CODE 636 W HCPCS: Performed by: STUDENT IN AN ORGANIZED HEALTH CARE EDUCATION/TRAINING PROGRAM

## 2024-12-11 PROCEDURE — 25000003 PHARM REV CODE 250: Performed by: HOSPITALIST

## 2024-12-11 PROCEDURE — 80202 ASSAY OF VANCOMYCIN: CPT | Performed by: STUDENT IN AN ORGANIZED HEALTH CARE EDUCATION/TRAINING PROGRAM

## 2024-12-11 PROCEDURE — 96366 THER/PROPH/DIAG IV INF ADDON: CPT

## 2024-12-11 PROCEDURE — 25000003 PHARM REV CODE 250: Performed by: STUDENT IN AN ORGANIZED HEALTH CARE EDUCATION/TRAINING PROGRAM

## 2024-12-11 PROCEDURE — 96365 THER/PROPH/DIAG IV INF INIT: CPT | Mod: 59

## 2024-12-11 PROCEDURE — G0378 HOSPITAL OBSERVATION PER HR: HCPCS

## 2024-12-11 PROCEDURE — 85025 COMPLETE CBC W/AUTO DIFF WBC: CPT | Performed by: HOSPITALIST

## 2024-12-11 PROCEDURE — 80053 COMPREHEN METABOLIC PANEL: CPT | Performed by: HOSPITALIST

## 2024-12-11 PROCEDURE — 36415 COLL VENOUS BLD VENIPUNCTURE: CPT | Performed by: STUDENT IN AN ORGANIZED HEALTH CARE EDUCATION/TRAINING PROGRAM

## 2024-12-11 RX ORDER — DOXYCYCLINE 100 MG/1
100 CAPSULE ORAL 2 TIMES DAILY
Qty: 10 CAPSULE | Refills: 0 | Status: SHIPPED | OUTPATIENT
Start: 2024-12-12 | End: 2024-12-17

## 2024-12-11 RX ORDER — VANCOMYCIN 1.75 GRAM/500 ML IN 0.9 % SODIUM CHLORIDE INTRAVENOUS
1750 ONCE
Status: COMPLETED | OUTPATIENT
Start: 2024-12-11 | End: 2024-12-11

## 2024-12-11 RX ORDER — LEVOFLOXACIN 750 MG/1
750 TABLET ORAL DAILY
Qty: 5 TABLET | Refills: 0 | Status: SHIPPED | OUTPATIENT
Start: 2024-12-11 | End: 2024-12-16

## 2024-12-11 RX ADMIN — Medication 4 TABLET: at 05:12

## 2024-12-11 RX ADMIN — DIPHENHYDRAMINE HYDROCHLORIDE 25 MG: 25 CAPSULE ORAL at 12:12

## 2024-12-11 RX ADMIN — Medication 4 TABLET: at 12:12

## 2024-12-11 RX ADMIN — HYDROCODONE BITARTRATE AND ACETAMINOPHEN 1 TABLET: 5; 325 TABLET ORAL at 03:12

## 2024-12-11 RX ADMIN — DIPHENHYDRAMINE HYDROCHLORIDE 25 MG: 25 CAPSULE ORAL at 02:12

## 2024-12-11 RX ADMIN — HYDROCODONE BITARTRATE AND ACETAMINOPHEN 1 TABLET: 5; 325 TABLET ORAL at 02:12

## 2024-12-11 RX ADMIN — CEFDINIR 300 MG: 300 CAPSULE ORAL at 08:12

## 2024-12-11 RX ADMIN — Medication 4 TABLET: at 08:12

## 2024-12-11 RX ADMIN — THERA TABS 1 TABLET: TAB at 08:12

## 2024-12-11 RX ADMIN — VANCOMYCIN HYDROCHLORIDE 1750 MG: 500 INJECTION, POWDER, LYOPHILIZED, FOR SOLUTION INTRAVENOUS at 09:12

## 2024-12-11 NOTE — PLAN OF CARE
[facility] - Med Surg  Discharge Final Note    Primary Care Provider: Fransisca Chen DO    Expected Discharge Date: 12/11/2024    Final Discharge Note (most recent)       Final Note - 12/11/24 0851          Final Note    Assessment Type Final Discharge Note     Anticipated Discharge Disposition Home or Self Care     Hospital Resources/Appts/Education Provided Appointments scheduled and added to AVS        Post-Acute Status    Discharge Delays None known at this time                 Patient has no d/c needs at this time. Sw to follow up, as needed, for d/c planning purposes.

## 2024-12-11 NOTE — DISCHARGE SUMMARY
[facility] Medicine  Discharge Summary      Patient Name: Nallely Mcclure  MRN: [id number]  [facility]: [id number]  Patient Class: OP- Observation  Admission Date: 12/9/2024  Hospital Length of Stay: 0 days  Discharge Date and Time: 12/11/24  Attending Physician: Amanda Hill,*   Discharging Provider: Amanda Hill MD  Primary Care Provider: Fransisca Chen DO    Primary Care Team: Networked reference to record PCT     HPI:   Nallely Mcclure is a 54 y.o. female with a PMH  has a past medical history of Anemia, unspecified, Fatty liver disease, nonalcoholic, Fibroids, and Hypertension. who presented to the ED for further evaluation of potential drug reaction and/or facial cellulitis.  Patient reported being seen in the ED, urgent care, as well as by her PCP and dermatologist regarding right facial swelling and numerous pimple like bumps since November.  She reported being prescribed Bactroban and metronidazole which was switch to cephalexin and Bactroban, then switched again to amoxicillin and Bactrim.  After initiation of her most recent antibiotics, she reported endorsing diffuse itching with her urticaria and worsening facial swelling in itching as well prompting her to come to the ED to be further evaluated.  Patient reported also being seen by rheumatologist outpatient following positive GODWIN test with the extended panel pain negative inpatient being told she does not need to worry about her GODWIN being positive as she has negative for autoimmune processes.  Since onset of symptoms, patient denied endorsing any lightheadedness, dizziness, headache, visual changes, fever, chills, sweats, throat swelling, dysphagia, nausea, vomiting, chest pain, shortness a breath, abdominal pain, dysuria, hematuria, melena, hematochezia, diarrhea, or onset neurological deficits.  Patient does report the right side of face feeling tight causing pressure which is now extending to the left side of  her face with pain exacerbated with opening her mouth however, reports all other review of systems negative except as noted above.  Initial workup in the ED revealed patient to be afebrile without leukocytosis, CBC and CMP fairly unremarkable with the exception of creatinine/GFR measuring 1.3/48.  Lactic acid and procalcitonin within normal limits.  CT maxillofacial positive for right-sided facial cellulitis with no sizable organized abscess fluid collection identified however did show shotty lymph nodes present in addition to right TMA degenerative changes noted and mild sinus opacities present.  Patient initiated on vancomycin and admitted to Hospital Medicine under observation for continued medical management.    PCP: Fransisca Chen       * No surgery found *      Hospital Course:   Admitted under Hospital Medicine for right-sided facial cellulitis   Currently on IV antibiotics   Follow up on blood cultures-- cultures remain negative to date   Ultrasound head and neck as of 12/11/2024 did not show abscess/pus collection    We will/11/24   Examination of patient done at bedside, appeared alert and oriented x3,   Denied acute events overnight   Remained afebrile, no leukocytosis   Denied fever, chills, chest pain, shortness O breath, nausea, vomiting, bowel or bladder issues   Blood culture x2 remained negative to date   Able to swallow without issues,;  stated improvement in pain/discomfort/swelling over right facial cellulitis area  Received IV antibiotics during hospital stay   Considering clinical and hemodynamic stability, planning to discharge patient today on oral antibiotics, emphasized on compliance with medications, outpatient follow up with PCP upon discharge, patient expressed understanding, agreed with the plan   Also follows with dermatology outpatient, emphasized on follow up visits;          Goals of Care Treatment Preferences:  Code Status: Full Code      SDOH Screening:  The patient was screened  "for utility difficulties, food insecurity, transport difficulties, housing insecurity, and interpersonal safety and there were no concerns identified this admission.     Consults:   Consults (From admission, onward)          Status Ordering Provider     Pharmacy to dose Vancomycin consult  Once        Provider:  (Not yet assigned)   Placed in "And" Linked Group    Acknowledged CHIDI ZABALA.            * Facial cellulitis  Patient presented with worsening facial swelling and rash times 3 weeks duration and was found to have evidence of cellulitis on CT maxillofacial.  Initial concerns for adverse drug reaction given multiple trials of antibiotics with patient receiving Benadryl prior to admission.  Patient seen by Dermatology outpatient and was being treated for rosacea as well as urgent care PCP for treatment of acne/cellulitis.  Initial workup in the ED revealed patient to be afebrile without leukocytosis, CBC and CMP fairly unremarkable with the exception of creatinine/GFR measuring 1.3/48.  Lactic acid and procalcitonin within normal limits.  CT maxillofacial positive for right-sided facial cellulitis with no sizable organized abscess fluid collection identified however did show shotty lymph nodes present in addition to right TMA degenerative changes noted and mild sinus opacities present.  Patient initiated on vancomycin and prn benadryl. Will her observe for response on antibiotics prior to initiating steroids and/or reaching out to Dermatology for further recommendations.  Plan:  -continue antibiotics  -continue benadryl p.r.n.  -monitor labs  -consider steroids and dermatology consult if symptoms fail to improve       HLD (hyperlipidemia)  Patient is chronically on statin.will continue for now. Last Lipid Panel:   Lab Results   Component Value Date    CHOL 115 (L) 09/27/2024    HDL 43 09/27/2024    LDLCALC 66.0 09/27/2024    TRIG 30 09/27/2024    CHOLHDL 37.4 09/27/2024   Plan:  -Continue home " medication  -low fat/low calorie diet      Hypertension  Chronic, controlled.  Latest blood pressure and vitals reviewed-   Temp:  [97.7 °F (36.5 °C)-99 °F (37.2 °C)]   Pulse:  []   Resp:  [16-20]   BP: (125-157)/()   SpO2:  [95 %-100 %] .   Home meds for hypertension were reviewed and noted below.   Hypertension Medications               lisinopriL-hydrochlorothiazide (PRINZIDE,ZESTORETIC) 20-12.5 mg per tablet Take 1 tablet by mouth once daily.     While in the hospital, will manage blood pressure as follows; Continue home antihypertensive regimen    Will utilize p.r.n. blood pressure medication only if patient's blood pressure greater than  180/110 and she develops symptoms such as worsening chest pain or shortness of breath.        Final Active Diagnoses:    Diagnosis Date Noted POA    PRINCIPAL PROBLEM:  Facial cellulitis [L03.211] 12/09/2024 Yes    HLD (hyperlipidemia) [E78.5] 12/10/2024 Yes     Chronic    Hypertension [I10]  Yes     Chronic      Problems Resolved During this Admission:       Discharged Condition: fair    Disposition: Home or Self Care    Follow Up:   Follow-up Information       Fransisca Chen,  Follow up in 1 week(s).    Specialty: Internal Medicine  Contact information:  [address] [place] [address] [address] [place] [place] [address] [phone]                           Patient Instructions:   No discharge procedures on file.    Significant Diagnostic Studies:     Results for orders placed or performed during the hospital encounter of 12/09/24   Hepatitis C Antibody    Collection Time: 12/09/24  3:45 PM   Result Value Ref Range    Hepatitis C Ab Negative Negative   HCV Virus Hold Specimen    Collection Time: 12/09/24  3:45 PM   Result Value Ref Range    HEP C Virus Hold Specimen Hold for HCV sendout    HIV 1/2 Ag/Ab (4th Gen)    Collection Time: 12/09/24  3:45 PM   Result Value Ref Range    HIV 1/2 Ag/Ab Negative Negative   CBC W/ AUTO DIFFERENTIAL    Collection Time: 12/09/24  3:45 PM    Result Value Ref Range    WBC 7.21 3.90 - 12.70 K/uL    RBC 4.93 4.00 - 5.40 M/uL    Hemoglobin 13.5 12.0 - 16.0 g/dL    Hematocrit 40.3 37.0 - 48.5 %    MCV 82 82 - 98 fL    MCH 27.4 27.0 - 31.0 pg    MCHC 33.5 32.0 - 36.0 g/dL    RDW 12.9 11.5 - 14.5 %    Platelets 314 150 - 450 K/uL    MPV 10.2 9.2 - 12.9 fL    Immature Granulocytes 0.3 0.0 - 0.5 %    Gran # (ANC) 4.5 1.8 - 7.7 K/uL    Immature Grans (Abs) 0.02 0.00 - 0.04 K/uL    Lymph # 2.0 1.0 - 4.8 K/uL    Mono # 0.5 0.3 - 1.0 K/uL    Eos # 0.2 0.0 - 0.5 K/uL    Baso # 0.04 0.00 - 0.20 K/uL    nRBC 0 0 /100 WBC    Gran % 62.9 38.0 - 73.0 %    Lymph % 27.0 18.0 - 48.0 %    Mono % 6.8 4.0 - 15.0 %    Eosinophil % 2.4 0.0 - 8.0 %    Basophil % 0.6 0.0 - 1.9 %    Differential Method Automated    Comp. Metabolic Panel    Collection Time: 12/09/24  3:45 PM   Result Value Ref Range    Sodium 138 136 - 145 mmol/L    Potassium 3.7 3.5 - 5.1 mmol/L    Chloride 103 95 - 110 mmol/L    CO2 22 (L) 23 - 29 mmol/L    Glucose 84 70 - 110 mg/dL    BUN 8 6 - 20 mg/dL    Creatinine 1.3 0.5 - 1.4 mg/dL    Calcium 10.7 (H) 8.7 - 10.5 mg/dL    Total Protein 9.0 (H) 6.0 - 8.4 g/dL    Albumin 4.6 3.5 - 5.2 g/dL    Total Bilirubin 0.9 0.1 - 1.0 mg/dL    Alkaline Phosphatase 94 40 - 150 U/L    AST 29 10 - 40 U/L    ALT 23 10 - 44 U/L    eGFR 48 (A) >60 mL/min/1.73 m^2    Anion Gap 13 8 - 16 mmol/L   Lactic acid, plasma    Collection Time: 12/09/24  3:45 PM   Result Value Ref Range    Lactate (Lactic Acid) 1.0 0.5 - 2.2 mmol/L   Blood Culture #1 **CANNOT BE ORDERED STAT**    Collection Time: 12/09/24  6:51 PM    Specimen: Peripheral, Antecubital, Right; Blood   Result Value Ref Range    Blood Culture, Routine No Growth to date     Blood Culture, Routine No Growth to date    Blood Culture #2 **CANNOT BE ORDERED STAT**    Collection Time: 12/09/24  6:51 PM    Specimen: Peripheral, Antecubital, Right; Blood   Result Value Ref Range    Blood Culture, Routine No Growth to date     Blood  Culture, Routine No Growth to date    Procalcitonin    Collection Time: 12/09/24  6:52 PM   Result Value Ref Range    Procalcitonin 0.02 <0.25 ng/mL   Comprehensive Metabolic Panel (CMP)    Collection Time: 12/10/24  6:28 AM   Result Value Ref Range    Sodium 140 136 - 145 mmol/L    Potassium 3.7 3.5 - 5.1 mmol/L    Chloride 104 95 - 110 mmol/L    CO2 24 23 - 29 mmol/L    Glucose 81 70 - 110 mg/dL    BUN 9 6 - 20 mg/dL    Creatinine 1.3 0.5 - 1.4 mg/dL    Calcium 9.8 8.7 - 10.5 mg/dL    Total Protein 8.1 6.0 - 8.4 g/dL    Albumin 4.1 3.5 - 5.2 g/dL    Total Bilirubin 1.3 (H) 0.1 - 1.0 mg/dL    Alkaline Phosphatase 90 40 - 150 U/L    AST 27 10 - 40 U/L    ALT 23 10 - 44 U/L    eGFR 48 (A) >60 mL/min/1.73 m^2    Anion Gap 12 8 - 16 mmol/L   CBC with Automated Differential    Collection Time: 12/10/24  6:28 AM   Result Value Ref Range    WBC 6.79 3.90 - 12.70 K/uL    RBC 4.71 4.00 - 5.40 M/uL    Hemoglobin 12.9 12.0 - 16.0 g/dL    Hematocrit 39.8 37.0 - 48.5 %    MCV 85 82 - 98 fL    MCH 27.4 27.0 - 31.0 pg    MCHC 32.4 32.0 - 36.0 g/dL    RDW 12.7 11.5 - 14.5 %    Platelets 316 150 - 450 K/uL    MPV 10.4 9.2 - 12.9 fL    Immature Granulocytes 0.3 0.0 - 0.5 %    Gran # (ANC) 3.3 1.8 - 7.7 K/uL    Immature Grans (Abs) 0.02 0.00 - 0.04 K/uL    Lymph # 2.4 1.0 - 4.8 K/uL    Mono # 0.6 0.3 - 1.0 K/uL    Eos # 0.4 0.0 - 0.5 K/uL    Baso # 0.05 0.00 - 0.20 K/uL    nRBC 0 0 /100 WBC    Gran % 49.3 38.0 - 73.0 %    Lymph % 35.3 18.0 - 48.0 %    Mono % 9.0 4.0 - 15.0 %    Eosinophil % 5.4 0.0 - 8.0 %    Basophil % 0.7 0.0 - 1.9 %    Differential Method Automated    Vancomycin, random    Collection Time: 12/10/24  9:25 AM   Result Value Ref Range    Vancomycin, Random 13.2 Not established ug/mL   Comprehensive Metabolic Panel (CMP)    Collection Time: 12/11/24  6:22 AM   Result Value Ref Range    Sodium 139 136 - 145 mmol/L    Potassium 4.7 3.5 - 5.1 mmol/L    Chloride 106 95 - 110 mmol/L    CO2 24 23 - 29 mmol/L    Glucose 92  70 - 110 mg/dL    BUN 16 6 - 20 mg/dL    Creatinine 1.1 0.5 - 1.4 mg/dL    Calcium 9.0 8.7 - 10.5 mg/dL    Total Protein 6.9 6.0 - 8.4 g/dL    Albumin 3.7 3.5 - 5.2 g/dL    Total Bilirubin 0.9 0.1 - 1.0 mg/dL    Alkaline Phosphatase 89 40 - 150 U/L    AST 20 10 - 40 U/L    ALT 19 10 - 44 U/L    eGFR 59 (A) >60 mL/min/1.73 m^2    Anion Gap 9 8 - 16 mmol/L   CBC with Automated Differential    Collection Time: 12/11/24  6:22 AM   Result Value Ref Range    WBC 6.77 3.90 - 12.70 K/uL    RBC 4.49 4.00 - 5.40 M/uL    Hemoglobin 12.1 12.0 - 16.0 g/dL    Hematocrit 38.3 37.0 - 48.5 %    MCV 85 82 - 98 fL    MCH 26.9 (L) 27.0 - 31.0 pg    MCHC 31.6 (L) 32.0 - 36.0 g/dL    RDW 12.7 11.5 - 14.5 %    Platelets 290 150 - 450 K/uL    MPV 10.1 9.2 - 12.9 fL    Immature Granulocytes 0.3 0.0 - 0.5 %    Gran # (ANC) 3.4 1.8 - 7.7 K/uL    Immature Grans (Abs) 0.02 0.00 - 0.04 K/uL    Lymph # 2.4 1.0 - 4.8 K/uL    Mono # 0.5 0.3 - 1.0 K/uL    Eos # 0.4 0.0 - 0.5 K/uL    Baso # 0.04 0.00 - 0.20 K/uL    nRBC 0 0 /100 WBC    Gran % 49.6 38.0 - 73.0 %    Lymph % 35.6 18.0 - 48.0 %    Mono % 7.4 4.0 - 15.0 %    Eosinophil % 6.5 0.0 - 8.0 %    Basophil % 0.6 0.0 - 1.9 %    Differential Method Automated    Vancomycin, random    Collection Time: 12/11/24  6:22 AM   Result Value Ref Range    Vancomycin, Random 10.7 Not established ug/mL        Imaging Results              CT Maxillofacial With Contrast (Final result)  Result time 12/09/24 19:57:37      Final result by Eloina Cardoza MD (12/09/24 19:57:37)                   Impression:    FINDINGS/  Right facial swelling/cellulitis.  No sizable organized abscess fluid collection identified.  Shotty lymph nodes present.  Right TMJ degenerative change present.  Mild sinus opacity present      Electronically signed by: Eloina Cardoza  Date:    12/09/2024  Time:    19:57               Narrative:    EXAMINATION:  CT MAXILLOFACIAL WITH CONTRAST    CLINICAL HISTORY:  Maxillary/facial  abscess;    TECHNIQUE:  Low dose axial images, sagittal and coronal reformations were obtained through the face with intravenous contrast enhancement.    COMPARISON:  None                                       Pending Diagnostic Studies:       None           Medications:     Medication List        START taking these medications      doxycycline 100 MG Cap  Commonly known as: VIBRAMYCIN  Take 1 capsule (100 mg total) by mouth 2 (two) times daily. for 5 days  Start taking on: December 12, 2024  Replaces: doxycycline 100 MG tablet     levoFLOXacin 750 MG tablet  Commonly known as: LEVAQUIN  Take 1 tablet (750 mg total) by mouth once daily. for 5 days            CONTINUE taking these medications      atorvastatin 10 MG tablet  Commonly known as: LIPITOR  Take 1 tablet (10 mg total) by mouth once daily.     cholecalciferol (vitamin D3) 50 mcg (2,000 unit) Cap capsule  Commonly known as: VITAMIN D3     fish oil-omega-3 fatty acids 300-1,000 mg capsule     LINZESS 145 mcg Cap capsule  Generic drug: linaCLOtide     lisinopriL-hydrochlorothiazide 20-12.5 mg per tablet  Commonly known as: PRINZIDE,ZESTORETIC  Take 1 tablet by mouth once daily.     MULTIVITAMIN 50 PLUS ORAL     multivitamin per tablet  Commonly known as: THERAGRAN     OMEGA 3-6-9 1,200 mg Cap  Generic drug: fish,bora,flax oils-om3,6,9no1     VITAMIN D ORAL            STOP taking these medications      amoxicillin 875 MG tablet  Commonly known as: AMOXIL     doxycycline 100 MG tablet  Commonly known as: VIBRA-TABS  Replaced by: doxycycline 100 MG Cap     estradioL 0.01 % (0.1 mg/gram) vaginal cream  Commonly known as: ESTRACE     ketorolac 10 mg tablet  Commonly known as: TORADOL     metronidazole 0.75% 0.75 % Crea  Commonly known as: METROCREAM     mupirocin 2 % ointment  Commonly known as: BACTROBAN     sulfamethoxazole-trimethoprim 800-160mg 800-160 mg Tab  Commonly known as: BACTRIM DS     tacrolimus 0.1 % ointment  Commonly known as: PROTOPIC      triamcinolone acetonide 0.025% 0.025 % Oint  Commonly known as: KENALOG               Where to Get Your Medications        These medications were sent to [facility] [address] [place], [facility] [address]      Hours: Mon-Fri, 8a-5:30p Phone: [phone]   doxycycline 100 MG Cap  levoFLOXacin 750 MG tablet          Indwelling Lines/Drains at time of discharge:   Lines/Drains/Airways       None                   Time spent on the discharge of patient: 95 minutes         Amanda Hill MD  Department of Hospital Medicine  [facility]

## 2024-12-11 NOTE — TELEPHONE ENCOUNTER
Returned call to pt. Pt scheduled ----- Message from Denis sent at 12/11/2024  3:17 PM CST -----  Contact: Nallely  Type:  Sooner Apoointment Request    Caller is requesting a sooner appointment.  Caller declined first available appointment listed below.  Caller will not accept being placed on the waitlist and is requesting a message be sent to doctor.  Name of Caller:Nallely  When is the first available appointment?3/6/2025  Symptoms: hospital follow up, Rash  Would the patient rather a call back or a response via [facility]?  Call back   Best Call Back Number:[phone]  Additional Information: patient is being discharged from the Hospital on today 12/11 and was instructed to make an appointment within a week.    Thanks   Am

## 2024-12-11 NOTE — PLAN OF CARE
Problem: Adult Inpatient Plan of Care  Goal: Plan of Care Review  Outcome: Progressing     Problem: Adult Inpatient Plan of Care  Goal: Patient-Specific Goal (Individualized)  Outcome: Progressing     Problem: Skin or Soft Tissue Infection  Goal: Absence of Infection Signs and Symptoms  Outcome: Progressing     Problem: Pain Acute  Goal: Optimal Pain Control and Function  Outcome: Progressing  Purposeful rounding   Bed in lowest position   Free from falls  Pain managed   Meds given per doc order   Chart check complete

## 2024-12-11 NOTE — PLAN OF CARE
Problem: Adult Inpatient Plan of Care  Goal: Plan of Care Review  Outcome: Adequate for Care Transition  Goal: Patient-Specific Goal (Individualized)  Outcome: Adequate for Care Transition  Goal: Absence of Hospital-Acquired Illness or Injury  Outcome: Adequate for Care Transition  Goal: Optimal Comfort and Wellbeing  Outcome: Adequate for Care Transition  Goal: Readiness for Transition of Care  Outcome: Adequate for Care Transition     Problem: Skin or Soft Tissue Infection  Goal: Absence of Infection Signs and Symptoms  Outcome: Adequate for Care Transition     Problem: Pain Acute  Goal: Optimal Pain Control and Function  Outcome: Adequate for Care Transition     Problem: Wound  Goal: Optimal Coping  Outcome: Adequate for Care Transition  Goal: Optimal Functional Ability  Outcome: Adequate for Care Transition  Goal: Absence of Infection Signs and Symptoms  Outcome: Adequate for Care Transition  Goal: Improved Oral Intake  Outcome: Adequate for Care Transition  Goal: Optimal Pain Control and Function  Outcome: Adequate for Care Transition  Goal: Skin Health and Integrity  Outcome: Adequate for Care Transition  Goal: Optimal Wound Healing  Outcome: Adequate for Care Transition

## 2024-12-12 ENCOUNTER — TELEPHONE (OUTPATIENT)
Dept: INTERNAL MEDICINE | Facility: CLINIC | Age: 54
End: 2024-12-12
Payer: COMMERCIAL

## 2024-12-12 NOTE — LETTER
December 12, 2024    Nallely Mcclure  [address]  [place] [address]             [facility]  Internal Medicine  [address] [place] [address] [address] [place] [address]  Phone: [phone]  Fax: [phone]   December 12, 2024     Patient: Nallely Mcclure   YOB: 1970   Date of Visit: 12/12/2024       To Whom it May Concern:    Nallely Mcclure was seen in my clinic on 12/09/2024. She may return to work on 12/12/2024 at 12:30 .    Please excuse her from any classes or work missed.    If you have any questions or concerns, please don't hesitate to call.    Sincerely,         Fransisca Chen, DO

## 2024-12-12 NOTE — TELEPHONE ENCOUNTER
----- Message from Christel sent at 12/12/2024  9:47 AM CST -----  Contact: Fransisca  .Type:  Patient Requesting Call    Who Called:Nallely  Does the patient know what this is regarding?:Patient states [facility] gave her a work excuse dated for 12/16/24 and patient states she needs to go back to work today. Wanting to see if she can be given an updated work excuse. Please call back  Would the patient rather a call back or a response via [facility]? Call back  Best Call Back Number:[phone]  Additional Information:

## 2024-12-15 LAB
BACTERIA BLD CULT: NORMAL
BACTERIA BLD CULT: NORMAL

## 2024-12-16 ENCOUNTER — E-CONSULT (OUTPATIENT)
Dept: DERMATOLOGY | Facility: CLINIC | Age: 54
End: 2024-12-16
Payer: COMMERCIAL

## 2024-12-16 DIAGNOSIS — L30.9 DERMATITIS, UNSPECIFIED: Primary | ICD-10-CM

## 2024-12-16 PROCEDURE — 99499 UNLISTED E&M SERVICE: CPT | Mod: ,,, | Performed by: STUDENT IN AN ORGANIZED HEALTH CARE EDUCATION/TRAINING PROGRAM

## 2024-12-16 NOTE — CONSULTS
[facility]  Response for E-Consult     Patient Name: Nallely Mcclure  MRN: [id number]  Primary Care Provider: Fransisca Chen DO   Requesting Provider: Melisa Choudhary, *  Consults    Unfortunately, this eConsult has been declined due to: Established Patient  - patient has apptmt with Dr. Jeronimo on 12/18. Sorry for the delay, but it appears complex and felt it is best handled in person. Thanks   Established Patient:  This eConsult submission cannot be completed at this time. This is an established patient in our specialty and we are currently managing the problem for which you have sent a question about. The eConsult tool is not meant to be used for problems which are currently being managed by a specialist. In order to further assist you, please contact our specialty through the usual channels.        Thank you for this eConsult referral.     Elaina Waller MD  [facility]

## 2024-12-18 ENCOUNTER — OFFICE VISIT (OUTPATIENT)
Dept: DERMATOLOGY | Facility: CLINIC | Age: 54
End: 2024-12-18
Payer: COMMERCIAL

## 2024-12-18 DIAGNOSIS — L81.9 DYSPIGMENTATION: ICD-10-CM

## 2024-12-18 DIAGNOSIS — L03.90 CELLULITIS, UNSPECIFIED CELLULITIS SITE: Primary | ICD-10-CM

## 2024-12-18 PROCEDURE — 1160F RVW MEDS BY RX/DR IN RCRD: CPT | Mod: CPTII,S$GLB,, | Performed by: STUDENT IN AN ORGANIZED HEALTH CARE EDUCATION/TRAINING PROGRAM

## 2024-12-18 PROCEDURE — 99214 OFFICE O/P EST MOD 30 MIN: CPT | Mod: S$GLB,,, | Performed by: STUDENT IN AN ORGANIZED HEALTH CARE EDUCATION/TRAINING PROGRAM

## 2024-12-18 PROCEDURE — 1159F MED LIST DOCD IN RCRD: CPT | Mod: CPTII,S$GLB,, | Performed by: STUDENT IN AN ORGANIZED HEALTH CARE EDUCATION/TRAINING PROGRAM

## 2024-12-18 PROCEDURE — 99999 PR PBB SHADOW E&M-EST. PATIENT-LVL III: CPT | Mod: PBBFAC,,, | Performed by: STUDENT IN AN ORGANIZED HEALTH CARE EDUCATION/TRAINING PROGRAM

## 2024-12-18 PROCEDURE — 4010F ACE/ARB THERAPY RXD/TAKEN: CPT | Mod: CPTII,S$GLB,, | Performed by: STUDENT IN AN ORGANIZED HEALTH CARE EDUCATION/TRAINING PROGRAM

## 2024-12-18 PROCEDURE — G2211 COMPLEX E/M VISIT ADD ON: HCPCS | Mod: S$GLB,,, | Performed by: STUDENT IN AN ORGANIZED HEALTH CARE EDUCATION/TRAINING PROGRAM

## 2024-12-18 RX ORDER — LEVOFLOXACIN 750 MG/1
750 TABLET ORAL DAILY
COMMUNITY

## 2024-12-18 RX ORDER — DESONIDE 0.5 MG/G
CREAM TOPICAL 2 TIMES DAILY
Qty: 60 G | Refills: 1 | Status: SHIPPED | OUTPATIENT
Start: 2024-12-18

## 2024-12-18 NOTE — LETTER
December 18, 2024      [facility] 4th Floor  [address] [address]  [place] [address]  Phone: [phone]  Fax: [phone]       Patient: Nallely Mcclure   YOB: 1970  Date of Visit: 12/18/2024    To Whom It May Concern:    Steven Mcclure  was at [facility] on 12/18/2024. The patient may return to work/school on 12/18/2024 with no restrictions. If you have any questions or concerns, or if I can be of further assistance, please do not hesitate to contact me.    Sincerely,    Soni Campos MA

## 2024-12-18 NOTE — PROGRESS NOTES
Subjective:       Patient ID:  Nallely Mcclure is a 54 y.o. female who presents for   Chief Complaint   Patient presents with    Rash     Located on the face. patient states being in the ER from December 9th-11th. Pt states an allergic reaction to penicillin . Pt given doxy and levoFLOXacin, and benadrly. S/s discoloration and dryness.      History of Present Illness: The patient presents with chief complaint of recent cellulitis of the face. Patient reports on 12/9/24 being hospitalized after developing cellulitis on the right cheek/face and was treated with antibiotics. Patient reports during this time developing a lot of itching and swelling after taking doxycycline, so has stopped taking this. Recently completed full course of antibiotics and here for follow-up. Main concern today is the darkened discoloration and dryness left behind from the cellulitis. Has been using OTC urea cream to help with dryness and discoloration, along with benadryl for the itching from the dryness.         Rash        Review of Systems   Constitutional:  Negative for fever and chills.   Skin:  Positive for itching, rash and dry skin.        Objective:    Physical Exam   Constitutional: She appears well-developed and well-nourished. No distress.   Neurological: She is alert and oriented to person, place, and time. She is not disoriented.   Psychiatric: She has a normal mood and affect.   Skin:   Areas Examined (abnormalities noted in diagram):   Head / Face Inspection Performed  Neck Inspection Performed              Diagram Legend     Erythematous scaling macule/papule c/w actinic keratosis       Vascular papule c/w angioma      Pigmented verrucoid papule/plaque c/w seborrheic keratosis      Yellow umbilicated papule c/w sebaceous hyperplasia      Irregularly shaped tan macule c/w lentigo     1-2 mm smooth white papules consistent with Milia      Movable subcutaneous cyst with punctum c/w epidermal inclusion cyst       Subcutaneous movable cyst c/w pilar cyst      Firm pink to brown papule c/w dermatofibroma      Pedunculated fleshy papule(s) c/w skin tag(s)      Evenly pigmented macule c/w junctional nevus     Mildly variegated pigmented, slightly irregular-bordered macule c/w mildly atypical nevus      Flesh colored to evenly pigmented papule c/w intradermal nevus       Pink pearly papule/plaque c/w basal cell carcinoma      Erythematous hyperkeratotic cursted plaque c/w SCC      Surgical scar with no sign of skin cancer recurrence      Open and closed comedones      Inflammatory papules and pustules      Verrucoid papule consistent consistent with wart     Erythematous eczematous patches and plaques     Dystrophic onycholytic nail with subungual debris c/w onychomycosis     Umbilicated papule    Erythematous-base heme-crusted tan verrucoid plaque consistent with inflamed seborrheic keratosis     Erythematous Silvery Scaling Plaque c/w Psoriasis     See annotation      Assessment / Plan:        Cellulitis  Dyspigmentation - right cheek/chin area; acute symptoms have resolved with antibiotics, and now with post inflammatory changes and xerosis of the skin in the areas most affected. Discussed methods to help heal skin. Will add mild steroid temporarily along with gentle soaps and moisturizers.   -     desonide (DESOWEN) 0.05 % cream; Apply topically 2 (two) times daily.  Dispense: 60 g; Refill: 1  -     Counseled patient on gentle skin care regimen, including need for sensitive soaps/detergents, as well as need for frequent use of sensitize moisturizers.     Once these acute symptoms are cleared, will then return back to treatment of periorificial dermatitis.          Follow up in about 3 months (around 3/18/2025).

## 2025-01-29 ENCOUNTER — TELEPHONE (OUTPATIENT)
Dept: PHARMACY | Facility: CLINIC | Age: 55
End: 2025-01-29
Payer: COMMERCIAL

## 2025-01-30 NOTE — TELEPHONE ENCOUNTER
[facility] Chart Review & Patient Outreach Details For Medication Adherence Project    Reason for Outreach Encounter: 3rd Party payor non-compliance report ([employer], BCBS, C, etc)  2.  Patient Outreach Method: Reviewed patient chart  and Pretty in my Pocket (PRIMP)t message  3.   Medication in question:    Hyperlipidemia Medications               atorvastatin (LIPITOR) 10 MG tablet Take 1 tablet (10 mg total) by mouth once daily.    fish,bora,flax oils-om3,6,9no1 (OMEGA 3-6-9) 1,200 mg Cap Omega 3    omega-3 fatty acids/fish oil (FISH OIL-OMEGA-3 FATTY ACIDS) 300-1,000 mg capsule Take by mouth once daily.                  atorvastatin  last filled  10/23 for 90 day supply      4.  Reviewed and or Updates Made To: Patient Chart  5. Outreach Outcomes and/or actions taken: Sent inquiry to patient: Waiting for response  Additional Notes:

## 2025-02-06 DIAGNOSIS — I10 ESSENTIAL HYPERTENSION: ICD-10-CM

## 2025-02-06 DIAGNOSIS — E78.5 HYPERLIPIDEMIA LDL GOAL <130: ICD-10-CM

## 2025-02-06 RX ORDER — ATORVASTATIN CALCIUM 10 MG/1
10 TABLET, FILM COATED ORAL DAILY
Qty: 90 TABLET | Refills: 1 | Status: SHIPPED | OUTPATIENT
Start: 2025-02-06

## 2025-02-06 NOTE — TELEPHONE ENCOUNTER
No care due was identified.  [facility] Embedded Care Due Messages. Reference number: [id number].   2/06/2025 5:48:34 PM CST

## 2025-02-07 NOTE — TELEPHONE ENCOUNTER
Refill Routing Note   Medication(s) are not appropriate for processing by [facility] for the following reason(s):        Required vitals abnormal    [facility] action(s):  Defer  Approve      Medication Therapy Plan: Patient was seen in the ED on 12/9/24 for Cellulitus of face. No changes were made to atorvastatin, lisinopril-HCTZ. Patient followed up with Mg Jeronimo MD on 12/18/24      Appointments  past 12m or future 3m with PCP    Date Provider   Last Visit   8/29/2024 Fransisca Chen DO   Next Visit   Visit date not found Fransisca Chen DO   ED visits in past 90 days: 0        Note composed:6:01 PM 02/06/2025

## 2025-02-11 RX ORDER — LISINOPRIL AND HYDROCHLOROTHIAZIDE 12.5; 2 MG/1; MG/1
1 TABLET ORAL DAILY
Qty: 90 TABLET | Refills: 1 | Status: SHIPPED | OUTPATIENT
Start: 2025-02-11

## 2025-02-17 ENCOUNTER — TELEPHONE (OUTPATIENT)
Dept: INTERNAL MEDICINE | Facility: CLINIC | Age: 55
End: 2025-02-17
Payer: COMMERCIAL

## 2025-02-17 NOTE — TELEPHONE ENCOUNTER
----- Message from Comfort sent at 2/17/2025  1:35 PM CST -----  Regarding: Re: Year supply of Med @ [facility]  Contact: [phone]  Good afternoon, Pt is requesting a call back from your clinical staff regarding a year supply of prescribed meds from [facility].Pt said she missed a call, stating it may have been your office trying to reach her.Thank you, Tika Lugo Navigator

## 2025-03-13 ENCOUNTER — PATIENT MESSAGE (OUTPATIENT)
Dept: RHEUMATOLOGY | Facility: CLINIC | Age: 55
End: 2025-03-13
Payer: COMMERCIAL

## 2025-03-23 ENCOUNTER — TELEPHONE (OUTPATIENT)
Dept: PHARMACY | Facility: CLINIC | Age: 55
End: 2025-03-23
Payer: COMMERCIAL

## 2025-03-23 NOTE — TELEPHONE ENCOUNTER
[facility]Population [facility] Chart Review & Patient Outreach Details For Medication Adherence Project    Reason for Outreach Encounter: 3rd Party payor non-compliance report ([employer], BCBS, [facility], etc)  2.  Patient Outreach Method: Reviewed Patient Chart  3.   Medication in question: atorvastatin   LAST FILLED: 2/2/25 for 90 day supply  Hyperlipidemia Medications              atorvastatin (LIPITOR) 10 MG tablet Take 1 tablet (10 mg total) by mouth once daily.    fish,bora,flax oils-om3,6,9no1 (OMEGA 3-6-9) 1,200 mg Cap Omega 3    omega-3 fatty acids/fish oil (FISH OIL-OMEGA-3 FATTY ACIDS) 300-1,000 mg capsule Take by mouth once daily.               4.  Reviewed and or Updates Made To: Patient Chart  5. Outreach Outcomes and/or actions taken: Patient filled medication and is on track to be adherent

## 2025-03-28 LAB
CHOLEST SERPL-MCNC: 156 MG/DL (ref 100–200)
CHOLEST SERPL-MCNC: 94 MG/DL
HDL/CHOLESTEROL RATIO: 2.5 %
HDLC SERPL-MCNC: 63 MG/DL
LDLC SERPL CALC-MCNC: 75 MG/DL
VLDLC SERPL-MCNC: 19 MG/DL (ref 5–40)

## 2025-03-31 ENCOUNTER — TELEPHONE (OUTPATIENT)
Dept: RHEUMATOLOGY | Facility: CLINIC | Age: 55
End: 2025-03-31
Payer: COMMERCIAL

## 2025-03-31 NOTE — TELEPHONE ENCOUNTER
patient missed call, left voicemail to call back and inform that  is leaving and they would need to find new rheumatologist

## 2025-04-04 ENCOUNTER — PATIENT OUTREACH (OUTPATIENT)
Dept: ADMINISTRATIVE | Facility: HOSPITAL | Age: 55
End: 2025-04-04
Payer: COMMERCIAL

## 2025-05-29 ENCOUNTER — OFFICE VISIT (OUTPATIENT)
Dept: INTERNAL MEDICINE | Facility: CLINIC | Age: 55
End: 2025-05-29
Payer: COMMERCIAL

## 2025-05-29 VITALS
OXYGEN SATURATION: 100 % | TEMPERATURE: 97 F | HEIGHT: 66 IN | HEART RATE: 86 BPM | DIASTOLIC BLOOD PRESSURE: 78 MMHG | SYSTOLIC BLOOD PRESSURE: 132 MMHG | WEIGHT: 191.56 LBS | BODY MASS INDEX: 30.79 KG/M2

## 2025-05-29 DIAGNOSIS — R14.0 BLOATING: ICD-10-CM

## 2025-05-29 DIAGNOSIS — R82.998 DARK URINE: Primary | ICD-10-CM

## 2025-05-29 LAB
BACTERIA #/AREA URNS AUTO: ABNORMAL /HPF
BILIRUB UR QL STRIP.AUTO: NEGATIVE
CLARITY UR: ABNORMAL
COLOR UR AUTO: YELLOW
GLUCOSE UR QL STRIP: NEGATIVE
HGB UR QL STRIP: NEGATIVE
KETONES UR QL STRIP: NEGATIVE
LEUKOCYTE ESTERASE UR QL STRIP: ABNORMAL
MICROSCOPIC COMMENT: ABNORMAL
NITRITE UR QL STRIP: POSITIVE
PH UR STRIP: 6 [PH]
PROT UR QL STRIP: NEGATIVE
RBC #/AREA URNS AUTO: 1 /HPF (ref 0–4)
SP GR UR STRIP: 1.01
SQUAMOUS #/AREA URNS AUTO: 6 /HPF
UROBILINOGEN UR STRIP-ACNC: NEGATIVE EU/DL
WBC #/AREA URNS AUTO: 8 /HPF (ref 0–5)

## 2025-05-29 PROCEDURE — 99999 PR PBB SHADOW E&M-EST. PATIENT-LVL IV: CPT | Mod: PBBFAC,,, | Performed by: INTERNAL MEDICINE

## 2025-05-29 PROCEDURE — 81001 URINALYSIS AUTO W/SCOPE: CPT | Performed by: INTERNAL MEDICINE

## 2025-05-29 PROCEDURE — 3008F BODY MASS INDEX DOCD: CPT | Mod: CPTII,S$GLB,, | Performed by: INTERNAL MEDICINE

## 2025-05-29 PROCEDURE — 99214 OFFICE O/P EST MOD 30 MIN: CPT | Mod: S$GLB,,, | Performed by: INTERNAL MEDICINE

## 2025-05-29 PROCEDURE — G2211 COMPLEX E/M VISIT ADD ON: HCPCS | Mod: S$GLB,,, | Performed by: INTERNAL MEDICINE

## 2025-05-29 PROCEDURE — 1160F RVW MEDS BY RX/DR IN RCRD: CPT | Mod: CPTII,S$GLB,, | Performed by: INTERNAL MEDICINE

## 2025-05-29 PROCEDURE — 4010F ACE/ARB THERAPY RXD/TAKEN: CPT | Mod: CPTII,S$GLB,, | Performed by: INTERNAL MEDICINE

## 2025-05-29 PROCEDURE — 1159F MED LIST DOCD IN RCRD: CPT | Mod: CPTII,S$GLB,, | Performed by: INTERNAL MEDICINE

## 2025-05-29 PROCEDURE — 3075F SYST BP GE 130 - 139MM HG: CPT | Mod: CPTII,S$GLB,, | Performed by: INTERNAL MEDICINE

## 2025-05-29 PROCEDURE — 3078F DIAST BP <80 MM HG: CPT | Mod: CPTII,S$GLB,, | Performed by: INTERNAL MEDICINE

## 2025-05-29 NOTE — PROGRESS NOTES
Nallely Mcclure  55 y.o. Black or  female  [id number]    Chief Complaint:  Chief Complaint   Patient presents with    Urinary Tract Infection     Pt states of dark urine with odor  . Pt states it been going on for about a month        History of Present Illness:  History of Present Illness    CHIEF COMPLAINT:  Ms. Mcclure presents today with concerns about dark, foul-smelling urine    URINARY SYMPTOMS:  She reports dark urine with foul odor for the past month. She denies burning, pain with urination, or new back pain associated with these symptoms.    GI SYMPTOMS:  She currently experiences significant bloating, describing feeling distended after eating daily. EGD in November 2022 showed gastric mucosal changes from ibuprofen use, which was subsequently discontinued. Colonoscopy in 2021 was normal with 10-year follow-up recommended. Ultrasound 3-4 years ago revealed a fatty liver but US in 2023 did not.    CURRENT MEDICATIONS:  She takes CoQ10 in the morning and atorvastatin at night. Previously took Align probiotic but discontinued after discussion with another physician.    SURGICAL HISTORY:  Past surgical history includes hysterectomy for painful fibroids.         Review of Systems   Constitutional:  Negative for fever.   Gastrointestinal:  Negative for abdominal pain.   Genitourinary:  Negative for dysuria and flank pain.       Laboratory:  Lab Results   Component Value Date    WBC 6.77 12/11/2024    HGB 12.1 12/11/2024    HCT 38.3 12/11/2024     12/11/2024    CHOL 115 (L) 09/27/2024    TRIG 94 03/28/2025    HDL 63 03/28/2025    ALT 19 12/11/2024    AST 20 12/11/2024     12/11/2024    K 4.7 12/11/2024     12/11/2024    CREATININE 1.1 12/11/2024    BUN 16 12/11/2024    CO2 24 12/11/2024    TSH 1.959 03/09/2020    HGBA1C 5.3 11/12/2020     Lab Results   Component Value Date    LDLCALC 75 03/28/2025       History:  Past Medical History:   Diagnosis Date    Anemia, unspecified      Fatty liver disease, nonalcoholic     Fibroids     Gastritis     EGD done on 11/22/24 (Dr. Dusty Adams)    Hypertension        Medications:  Medications Ordered Prior to Encounter[1]    Allergies:  Review of patient's allergies indicates:   Allergen Reactions    Adhesive      Other reaction(s): Rash, leaves marked impression on area of contact    Amoxicillin     Penicillin Other (See Comments)    Penicillins     Sulfamethoxazole-trimethoprim Other (See Comments)       Exam:  Vitals:    05/29/25 1443   BP: 132/78   Pulse: 86   Temp: 96.8 °F (36 °C)     Weight: 86.9 kg (191 lb 9.3 oz)   Body mass index is 30.92 kg/m².      Physical Exam    Vitals: Reviewed.  Constitutional: No acute distress. Well-developed. Not ill-appearing.  Eyes: No scleral icterus.  Cardiovascular: Normal rate  Pulmonary: Pulmonary effort is normal. No respiratory distress. Abdomen: Soft. Nontender. Nondistended. Normoactive bowel sounds.No CVA tenderness  Neurological: Alert and oriented to person, place, and time.  Psychiatric: Behavior normal.         Assessment:  The primary encounter diagnosis was Dark urine. A diagnosis of Bloating was also pertinent to this visit.    Assessment & Plan    DARK URINE:  - Monitored the patient's report of darker urine with a foul odor for the past month.  - Ms. Mcclure denies burning or pain with urination.  - Ordered urinalysis to investigate these symptoms and instructed patient to provide a urine sample.  - Advised to contact the office for test results.    ABDOMINAL BLOATING:  - Monitored patient's reports of feeling full after eating and abdominal protrusion.  - No specific test has identified the cause of the abdominal distension.  - Potential causes include IBS and other functional issues.  - Explained that fatty liver is likely not the cause of feeling full after eating, as there are many other structures in the abdomen besides the liver.                         [1]   Current Outpatient Medications  on File Prior to Visit   Medication Sig Dispense Refill    atorvastatin (LIPITOR) 10 MG tablet Take 1 tablet (10 mg total) by mouth once daily. 90 tablet 1    cetirizine (ZYRTEC) 10 MG tablet 1 tablet Orally Once a day for 30 days      cholecalciferol, vitamin D3, (VITAMIN D3) 50 mcg (2,000 unit) Cap capsule Take 1 capsule by mouth once daily.      coenzyme Q10 100 mg capsule as directed Orally      erythromycin (ROMYCIN) ophthalmic ointment 1 application into the lower eyelid of affected eye Ophthalmic Four times a day for 7 days      estradioL (ESTRACE) 0.01 % (0.1 mg/gram) vaginal cream Place 1 g vaginally twice a week. 42.5 g 1    fluconazole (DIFLUCAN) 150 MG Tab 1 tablet Orally every 3 days for 3 days      LINZESS 145 mcg Cap capsule Take 145 mcg by mouth nightly.      lisinopriL-hydrochlorothiazide (PRINZIDE,ZESTORETIC) 20-12.5 mg per tablet Take 1 tablet by mouth once daily. 90 tablet 1    multivitamin (THERAGRAN) per tablet Take 1 tablet by mouth once daily.      omega-3 fatty acids/fish oil (FISH OIL-OMEGA-3 FATTY ACIDS) 300-1,000 mg capsule Take by mouth once daily.      predniSONE (DELTASONE) 20 MG tablet 1 tablet Orally Once a day for 5      [DISCONTINUED] desonide (DESOWEN) 0.05 % cream Apply topically 2 (two) times daily. 60 g 1    [DISCONTINUED] ergocalciferol, vitamin D2, (VITAMIN D ORAL) Vitamin D      [DISCONTINUED] fish,bora,flax oils-om3,6,9no1 (OMEGA 3-6-9) 1,200 mg Cap Omega 3      [DISCONTINUED] ketorolac (TORADOL) 10 mg tablet 1 tablet with food or milk as needed Orally every 8 hrs for 5 day(s)      [DISCONTINUED] levoFLOXacin (LEVAQUIN) 750 MG tablet Take 750 mg by mouth once daily.      [DISCONTINUED] multivit with minerals/lutein (MULTIVITAMIN 50 PLUS ORAL) Multivitamin      [DISCONTINUED] mupirocin (BACTROBAN) 2 % ointment 1 Application.       No current facility-administered medications on file prior to visit.

## 2025-06-02 ENCOUNTER — TELEPHONE (OUTPATIENT)
Dept: INTERNAL MEDICINE | Facility: CLINIC | Age: 55
End: 2025-06-02
Payer: COMMERCIAL

## 2025-06-02 ENCOUNTER — RESULTS FOLLOW-UP (OUTPATIENT)
Dept: INTERNAL MEDICINE | Facility: CLINIC | Age: 55
End: 2025-06-02

## 2025-06-02 RX ORDER — CIPROFLOXACIN 500 MG/1
500 TABLET, FILM COATED ORAL 2 TIMES DAILY
Qty: 14 TABLET | Refills: 0 | Status: SHIPPED | OUTPATIENT
Start: 2025-06-02 | End: 2025-06-09

## 2025-07-07 ENCOUNTER — TELEPHONE (OUTPATIENT)
Dept: PHARMACY | Facility: CLINIC | Age: 55
End: 2025-07-07
Payer: COMMERCIAL

## 2025-07-07 NOTE — TELEPHONE ENCOUNTER
[facility] Chart Review & Patient Outreach Details For Medication Adherence Project    Reason for Outreach Encounter: 3rd Party payor non-compliance report ([employer], BCBS, [employer], etc)  2.  Patient Outreach Method: Reviewed patient chart   3.   Medication in question:    Hyperlipidemia Medications              atorvastatin (LIPITOR) 10 MG tablet Take 1 tablet (10 mg total) by mouth once daily.    omega-3 fatty acids/fish oil (FISH OIL-OMEGA-3 FATTY ACIDS) 300-1,000 mg capsule Take by mouth once daily.                  atorvastatin   last filled  5/31/25 for 90 day supply    4.  Reviewed and or Updates Made To: Patient Chart  5. Outreach Outcomes and/or actions taken: Patient filled medication and is on track to be adherent  Additional Notes:

## 2025-08-03 ENCOUNTER — TELEPHONE (OUTPATIENT)
Dept: PHARMACY | Facility: CLINIC | Age: 55
End: 2025-08-03
Payer: COMMERCIAL

## 2025-08-04 NOTE — TELEPHONE ENCOUNTER
[facility] Chart Review & Patient Outreach Details For Medication Adherence Project    Reason for Outreach Encounter: 3rd Party payor non-compliance report ([employer], BCBS, [employer], etc)  2.  Patient Outreach Method: Reviewed patient chart   3.   Medication in question:    Hyperlipidemia Medications              atorvastatin (LIPITOR) 10 MG tablet Take 1 tablet (10 mg total) by mouth once daily.    omega-3 fatty acids/fish oil (FISH OIL-OMEGA-3 FATTY ACIDS) 300-1,000 mg capsule  No dispenses within 180 days of the adherence period (since 7/11/2024)  Take by mouth once daily.                  LF 90 ds 5/31/25    4.  Reviewed and or Updates Made To: Patient Chart  5. Outreach Outcomes and/or actions taken: Patient filled medication and is on track to be adherent  Additional Notes: